# Patient Record
Sex: MALE | Race: WHITE | NOT HISPANIC OR LATINO | Employment: OTHER | URBAN - METROPOLITAN AREA
[De-identification: names, ages, dates, MRNs, and addresses within clinical notes are randomized per-mention and may not be internally consistent; named-entity substitution may affect disease eponyms.]

---

## 2023-07-18 ENCOUNTER — HOSPITAL ENCOUNTER (OUTPATIENT)
Dept: ULTRASOUND IMAGING | Facility: HOSPITAL | Age: 78
Discharge: HOME | End: 2023-07-18
Attending: UROLOGY | Admitting: UROLOGY
Payer: MEDICARE

## 2023-07-18 DIAGNOSIS — R97.20 ELEVATED PSA: ICD-10-CM

## 2023-07-18 PROCEDURE — 76942 ECHO GUIDE FOR BIOPSY: CPT

## 2023-07-18 PROCEDURE — G0416 PROSTATE BIOPSY, ANY MTHD: HCPCS | Performed by: PATHOLOGY

## 2023-07-18 PROCEDURE — 55700 HB BIOPSY OF PROSTATE: CPT

## 2023-07-18 RX ORDER — LIDOCAINE HYDROCHLORIDE 20 MG/ML
1 JELLY TOPICAL ONCE
Status: COMPLETED | OUTPATIENT
Start: 2023-07-18 | End: 2023-07-18

## 2023-07-18 RX ORDER — LIDOCAINE HYDROCHLORIDE 10 MG/ML
10 INJECTION, SOLUTION EPIDURAL; INFILTRATION; INTRACAUDAL; PERINEURAL ONCE
Status: COMPLETED | OUTPATIENT
Start: 2023-07-18 | End: 2023-07-18

## 2023-07-18 RX ADMIN — LIDOCAINE HYDROCHLORIDE 1 APPLICATION: 20 JELLY TOPICAL at 09:28

## 2023-07-18 RX ADMIN — LIDOCAINE HYDROCHLORIDE 10 ML: 10 INJECTION, SOLUTION EPIDURAL; INFILTRATION; INTRACAUDAL; PERINEURAL at 09:30

## 2023-07-18 NOTE — OP NOTE
Preoperative diagnosis  Elevated PSA  Postoperative diagnosis  Same, pathology pending  Procedure  Ultrasound-guided transrectal needle biopsy of the prostate  Surgeon  Harriet Gordillo DO  Anesthesia  Local  Brief history  This is a 71-year-old male who recently underwent PSA level determination on June 26, 2023  PSA came back elevated at 24.24. There was no underlying cause for the elevation such as urinary infection  On digital rectal examination there was loss of the median sulcus with prominence noted in that area on either side of the midline and in the midline  Biopsy is being pursued to rule out prostate malignancy.   Procedure  Patient was identified  Timeout was taken  Local anesthetic was instilled per rectum  Ultrasound probe was placed  Imaging the prostate gland was done  Under ultrasound guidance local anesthetic was instilled at the base of the prostate bilaterally  Under ultrasound guidance tissue cores were obtained 7 from the left, 6 from the right  Tissue cores were submitted in separate containers marked left and right for pathologic review  Ultrasound probe was withdrawn  Patient was discharged with written postoperative instructions  He is to follow-up with the office to review the biopsy results  Radiology will be dictating a separate report which reflects today's prostate ultrasound findings  Procedure was well tolerated

## 2023-07-19 PROCEDURE — G0416 PROSTATE BIOPSY, ANY MTHD: HCPCS | Performed by: PATHOLOGY

## 2023-08-09 ENCOUNTER — HOSPITAL ENCOUNTER (OUTPATIENT)
Dept: NUCLEAR MEDICINE | Facility: HOSPITAL | Age: 78
Discharge: HOME | End: 2023-08-09
Attending: UROLOGY
Payer: MEDICARE

## 2023-08-09 ENCOUNTER — HOSPITAL ENCOUNTER (OUTPATIENT)
Dept: CT IMAGING | Facility: HOSPITAL | Age: 78
Discharge: HOME | End: 2023-08-09
Attending: UROLOGY
Payer: MEDICARE

## 2023-08-09 DIAGNOSIS — C61 MALIGNANT NEOPLASM OF PROSTATE (HCC): ICD-10-CM

## 2023-08-09 PROCEDURE — A9503 TC99M MEDRONATE: HCPCS

## 2023-08-09 PROCEDURE — 78306 BONE IMAGING WHOLE BODY: CPT

## 2023-08-09 PROCEDURE — 74177 CT ABD & PELVIS W/CONTRAST: CPT

## 2023-08-09 PROCEDURE — G1004 CDSM NDSC: HCPCS

## 2023-08-09 RX ADMIN — IOHEXOL 100 ML: 350 INJECTION, SOLUTION INTRAVENOUS at 10:39

## 2023-08-23 ENCOUNTER — TRANSCRIBE ORDERS (OUTPATIENT)
Dept: RADIATION ONCOLOGY | Facility: HOSPITAL | Age: 78
End: 2023-08-23

## 2023-08-23 DIAGNOSIS — C61 MALIGNANT NEOPLASM OF PROSTATE (HCC): Primary | ICD-10-CM

## 2023-08-28 ENCOUNTER — RADIATION ONCOLOGY CONSULT (OUTPATIENT)
Dept: RADIATION ONCOLOGY | Facility: CLINIC | Age: 78
End: 2023-08-28
Payer: MEDICARE

## 2023-08-28 ENCOUNTER — CLINICAL SUPPORT (OUTPATIENT)
Dept: RADIATION ONCOLOGY | Facility: CLINIC | Age: 78
End: 2023-08-28
Attending: RADIOLOGY
Payer: MEDICARE

## 2023-08-28 ENCOUNTER — TELEPHONE (OUTPATIENT)
Dept: RADIATION ONCOLOGY | Facility: CLINIC | Age: 78
End: 2023-08-28

## 2023-08-28 VITALS
TEMPERATURE: 97 F | WEIGHT: 202.5 LBS | RESPIRATION RATE: 16 BRPM | DIASTOLIC BLOOD PRESSURE: 84 MMHG | BODY MASS INDEX: 30.79 KG/M2 | OXYGEN SATURATION: 96 % | HEART RATE: 56 BPM | SYSTOLIC BLOOD PRESSURE: 156 MMHG

## 2023-08-28 DIAGNOSIS — C61 PROSTATE CANCER (HCC): Primary | ICD-10-CM

## 2023-08-28 DIAGNOSIS — C61 MALIGNANT NEOPLASM OF PROSTATE (HCC): ICD-10-CM

## 2023-08-28 PROCEDURE — 99211 OFF/OP EST MAY X REQ PHY/QHP: CPT | Performed by: RADIOLOGY

## 2023-08-28 PROCEDURE — 99205 OFFICE O/P NEW HI 60 MIN: CPT | Performed by: RADIOLOGY

## 2023-08-28 RX ORDER — ROSUVASTATIN CALCIUM 5 MG/1
5 TABLET, COATED ORAL DAILY
COMMUNITY

## 2023-08-28 NOTE — PROGRESS NOTES
Consultation - Radiation Oncology      K:[id number] : 1945  Encounter: [id number]  Patient Information: Russel Haji      CHIEF COMPLAINT  Chief Complaint   Patient presents with   • Prostate Cancer   • Consult     Cancer Staging   Prostate cancer [facility])  Staging form: Prostate, AJCC 8th Edition  - Clinical stage from 2023: Stage IIIA (cT2a, cN0, cM0, PSA: 24.2, Grade Group: 1) - Signed by Frolyan Black MD on 2023  Stage prefix: Initial diagnosis  Prostate specific antigen (PSA) range: 20 or greater  [place] primary pattern: 3  [place] secondary pattern: 3  Histologic grading system: 5 grade system           History of Present Illness   Russel Haji is a 69 y.o. man with minimal comorbidities and excellent KPS who was recently diagnosed with T2aN0 prostate cancer, Adan 3+3=6 with pre-treatment PSA is 24.24 ng/ml. He is being referred by Dr. Иван Pena and presents today for consult.     23 PSA 24.24 ([facility])     23 Dr. Pacheco Quinones  Elevated PSA  Plan for biopsy     23 TRUS prostate biopsy  Prostate size is 4.9 x 5.8 x 4.0 cm = 60 gm. Pathology demonstrated 8/12 cores positive demonstrated Adan score 6 (3+3) involving 5-80% of the submitted tissue. Both lobes involved. PNI was identified both sides.     23 Dr. Pacheco Quinones  Review biopsy results  Order CT amd bone scan     23 CT A/P w contrast  No evidence of abdominopelvic metastatic disease.     23 bone scan  No scintigraphic evidence of osseous metastasis.     The patient generally feels well. He denies significant urinary symptoms including hematuria, dysuria, urinary incontinence. IPSS score is excellent at 0. He denies diarrhea, hemorrhoids, or rectal bleeding. He has no history of colonoscopy. He denies bone pain. He is not sexually active. [facility] ([facility]) erectile function score 0-1/3. Historical Information   Oncology History   Prostate cancer ([address])   2023 Biopsy    A.  Prostate, Left, needle Biopsy: - Prostatic adenocarcinoma,   -Goldonna grade 3 +3 = score  6  -Tumor involves five of total seven submitted core  -Tumor involves approximately 15%, 25%, 40%, 55%, 80% of each core biopsy  - Perineural invasion: Present. B. Prostate, Right, needle Biopsy:   - Prostatic adenocarcinoma,   -Goldonna grade 3 +3 = score  6  -Tumor involves three of total six submitted core  -Tumor involves approximately 5%,15%, 25% of each core biopsy  - Perineural invasion: Present.       2023 -  Cancer Staged    Staging form: Prostate, AJCC 8th Edition  - Clinical stage from 2023: Stage IIIA (cT2a, cN0, cM0, PSA: 24.2, Grade Group: 1) - Signed by Colt Lomeli MD on 2023  Stage prefix: Initial diagnosis  Prostate specific antigen (PSA) range: 20 or greater  [place] primary pattern: 3  [place] secondary pattern: 3  Histologic grading system: 5 grade system       2023 Initial Diagnosis    Prostate cancer [facility])           Past Medical History:   Diagnosis Date   • Asthma    • Hyperlipidemia    • Prostate cancer [facility])      Past Surgical History:   Procedure Laterality Date   • CATARACT EXTRACTION Bilateral    • TOOTH EXTRACTION     • US GUIDED PROSTATE BIOPSY  2023       Family History   Problem Relation Age of Onset   • Heart disease Mother        Social History   Social History     Substance and Sexual Activity   Alcohol Use Yes   • Alcohol/week: 4.0 standard drinks of alcohol   • Types: 4 Cans of beer per week     Social History     Substance and Sexual Activity   Drug Use Not on file     Social History     Tobacco Use   Smoking Status Former   • Packs/day: 1.50   • Types: Cigarettes   • Start date:    • Quit date:    • Years since quittin.6   Smokeless Tobacco Former   • Types: Chew   • Quit date:          Meds/Allergies     Current Outpatient Medications:   •  rosuvastatin (Crestor) 5 mg tablet, Take 5 mg by mouth daily, Disp: , Rfl:   No Known Allergies      Review of Systems Constitutional: Positive for fatigue. HENT: Positive for hearing loss. Eyes: Negative. Respiratory: Negative. Cardiovascular: Negative. Gastrointestinal: Negative. Genitourinary: Negative. Musculoskeletal: Negative. Skin: Negative. Allergic/Immunologic: Negative. Neurological: Negative. Hematological: Negative. Psychiatric/Behavioral: Positive for sleep disturbance. IPSS Questionnaire (AUA-7): Over the past month…     1)  How often have you had a sensation of not emptying your bladder completely after you finish urinating? 0 - Not at all   2)  How often have you had to urinate again less than two hours after you finished urinating? 0 - Not at all   3)  How often have you found you stopped and started again several times when you urinated? 0 - Not at all   4) How difficult have you found it to postpone urination? 0 - Not at all   5) How often have you had a weak urinary stream?  0 - Not at all   6) How often have you had to push or strain to begin urination? 0 - Not at all   7) How many times did you most typically get up to urinate from the time you went to bed until the time you got up in the morning?  0 - None   Total Score:  0         OBJECTIVE:   /84   Pulse 56   Temp (!) 97 °F (36.1 °C)   Resp 16   Wt 91.9 kg (202 lb 8 oz)   SpO2 96%   BMI 30.79 kg/m²   Performance Status: Karnofsky: 100 - Fully active, able to carry on all pre-disease performed without restriction    Physical Exam  Vitals and nursing note reviewed. Constitutional:       General: He is not in acute distress. Appearance: He is well-developed. Cardiovascular:      Rate and Rhythm: Normal rate and regular rhythm. Pulmonary:      Breath sounds: No wheezing, rhonchi or rales. Abdominal:      Palpations: Abdomen is soft. Tenderness: There is no abdominal tenderness. There is no right CVA tenderness or left CVA tenderness.    Genitourinary:     Comments: RONNI demonstrates enlarged prostate. There was small area firmness central base region, but no definite nodules. No blood on examiner's finger. Musculoskeletal:      Right lower leg: No edema. Left lower leg: No edema. Comments: No spinal tenderness to percussion   Lymphadenopathy:      Cervical: No cervical adenopathy. Upper Body:      Right upper body: No supraclavicular adenopathy. Left upper body: No supraclavicular adenopathy. Lower Body: No right inguinal adenopathy. No left inguinal adenopathy. Neurological:      Mental Status: He is alert and oriented to person, place, and time. Gait: Gait normal.          RESULTS  Imaging Studies  NM bone scan whole body    Result Date: 8/9/2023  Narrative: BONE SCAN  WHOLE BODY INDICATION: C61: Malignant neoplasm of prostate PREVIOUS FILM CORRELATION:    CT abdomen pelvis from same day. TECHNIQUE:   This study was performed following the intravenous administration of 27.3 mCi Tc-99m labeled MDP. Delayed, anterior and posterior whole body images were acquired, 2-3 hours after radiopharmaceutical administration. Additional delayed oblique static images acquired of the bilateral ribs and pelvis. FINDINGS: There is no scintigraphic evidence of osseous metastasis. Increased radiotracer activity in the bilateral manubrioclavicular joints, hands, wrists, thoracolumbar spine, medial left knee and right foot likely corresponding to degenerative changes. Only the right kidney demonstrates physiologic radiotracer activity, likely corresponding to significantly atrophied left kidney seen on same-day CT. Impression: 1. No scintigraphic evidence of osseous metastasis. Resident: Jennifer Cutler, the attending radiologist, have reviewed the images and agree with the final report above.  Workstation performed: [id number]     CT abdomen pelvis w contrast    Result Date: 8/9/2023  Narrative: CT ABDOMEN AND PELVIS WITH IV CONTRAST INDICATION:   C61: Malignant neoplasm of prostate. COMPARISON:  None. TECHNIQUE:  CT examination of the abdomen and pelvis was performed. Multiplanar 2D reformatted images were created from the source data. This examination, like all CT scans performed in the [facility], was performed utilizing techniques to minimize radiation dose exposure, including the use of iterative reconstruction and automated exposure control. Radiation dose length product (DLP) for this visit:  826.33 mGy-cm IV Contrast:  100 mL of iohexol (OMNIPAQUE) 350 Enteric Contrast: Enteric contrast was administered. FINDINGS: ABDOMEN LOWER CHEST: COPD. Minimal bibasilar reticular scarring. LIVER/BILIARY TREE:  Unremarkable. GALLBLADDER:  No calcified gallstones. No pericholecystic inflammatory change. SPLEEN:  Unremarkable. PANCREAS:  Unremarkable. ADRENAL GLANDS:  Unremarkable. KIDNEYS/URETERS: Markedly atretic left kidney likely on a developmental or congenital basis. One or more sharply circumscribed subcentimeter renal hypodensities are noted. These lesions are too small to accurately characterize, but are statistically most  likely to represent benign cortical renal cyst(s). According to the guidelines published in the [facility] Paper of the ACR Incidental Findings Committee (Radiology 2010), no further workup of these lesions is recommended. 1 cm right renal simple cyst. No perinephric collection. STOMACH AND BOWEL: Scattered colonic diverticula without diverticulitis. No bowel obstruction. APPENDIX: A normal appendix was visualized. ABDOMINOPELVIC CAVITY:  No ascites. No pneumoperitoneum. No lymphadenopathy. VESSELS: Aortoiliac calcification. No aneurysm. PELVIS REPRODUCTIVE ORGANS: Large prostate gland protruding into the bladder base. URINARY BLADDER: Unremarkable allowing for degree of distention. ABDOMINAL WALL/INGUINAL REGIONS: Small fat-containing bilateral inguinal hernias.  OSSEOUS STRUCTURES: No acute fracture or osseous destructive lesion identified. Degenerative changes of the spine and bilateral hip and sacroiliac joints. Impression: No evidence of abdominopelvic metastatic disease. Workstation performed: [id number]     Pathology:  • Collected 7/18/2023 10:00     • Status: Final result    • Visible to patient: Yes (seen)     • 0 Result Notes      Component    Case Report   Surgical Pathology Report                         Case: [id number]                                    Authorizing Provider:  Nghia Ramirez DO         Collected:           07/18/2023 1000               Ordering Location:     [facility]        Received:            07/18/2023 [facility] Ultrasound                                                          Pathologist:           Ignacio Hansen MD                                                        Specimens:   A) - Prostate, Left                                                                                 B) - Prostate, Right                                                                       Final Diagnosis   A. Prostate, Left, needle Biopsy:   - Prostatic adenocarcinoma,   -Adan grade 3 +3 = score  6  -Tumor involves five of total seven submitted core  -Tumor involves approximately 15%, 25%, 40%, 55%, 80% of each core biopsy  - Perineural invasion: Present. B. Prostate, Right, needle Biopsy:   - Prostatic adenocarcinoma,   -[place]el[place] grade 3 +3 = score  6  -Tumor involves three of total six submitted core  -Tumor involves approximately 5%,15%, 25% of each core biopsy  - Perineural invasion: Present. Electronically signed by Ignacio Hansen MD on 7/19/2023 at 1540   Note    Block A1 contain many tumor cells could be used for future molecular testing if clinically requested.    Intradepartmental consultation is in agreement   [facility], [address] [place] [address]       Clinical data  Op notes   This is a 50-year-old male who recently underwent PSA level determination on June 26, 2023  PSA came back elevated at 24.24              ASSESSMENT  1. Malignant neoplasm of prostate [facility])  Ambulatory Referral to Radiation Oncology        Cancer Staging   Prostate cancer [facility])  Staging form: Prostate, AJCC 8th Edition  - Clinical stage from 7/18/2023: Stage IIIA (cT2a, cN0, cM0, PSA: 24.2, Grade Group: 1) - Signed by Ricky Cage MD on 8/28/2023  Stage prefix: Initial diagnosis  Prostate specific antigen (PSA) range: 20 or greater  [place] primary pattern: 3  [place] secondary pattern: 3  Histologic grading system: 5 grade system      PLAN/DISCUSSION  Bob Faustin is a 69 y.o. man with excellent performance status and minimal comorbidities who was recently diagnosed clinical T2aN0 high-volume Adan score 6 (3+3) prostate cancer with a pretreatment PSA of 24.24 ng/mL. Staging work-up showed no metastatic disease. AUA and NCCN risk categorization is consistent with high risk prostate cancer. Due to the patient's age she is not considered a good surgical candidate. Given his excellent performance status and minimal comorbidities as well as high risk category, I did not feel he was an good candidate for active surveillance. I recommended concurrent long-term ADT for 18 months with definitive radiation to the prostate using moderately hypofractionated or conventional hypofractionation to 70-79.2 Gy in 28-44 fractions depending on normal tissue tolerance. I feel this would offer the patient benefit in terms of local control and possible cure. This is in accordance with NCCN guidelines. The rationale and potential benefits, as well as the risks and acute and late side effects and potential toxicities of radiation were discussed with the patient at length. Side effects discussed included, but were not limited to:  Fatigue, irritative urinary side effects, diarrhea, rectal urgency, radiation proctitis, erectile dysfunction, and radiation cystitis. He is 69years old and we discussed that any ADT is beneficial over none for high risk prostate cancer in terms of disease control. We discussed that for high risk prostate cancer patients additional ADT up to 18 to 24 months has demonstrated an additional improvement in survival.  However, if the patient cannot tolerate a full 18 to 24 months then shortening course would still provide benefit albeit less. They expressed understanding. We discussed alternative treatment options including definitive radiation alone, brachytherapy with ADT or as boost, SBRT with ADT or alone. I am concerned whether patient would be a candidate for SBRT or brachytherapy given his prostate size, but his IPSS score is excellent at 0 and he could be considered. I explained prostate brachytherapy is not available at [facility], but we would be happy to refer to tertiary center for consultation if he desires. The pros and cons of brachytherapy alone or as boost versus external beam radiation were discussed with the patient and his son. They did not wish to travel to another facility nor pursue this treatment. We discussed SBRT consideration. I explained that he will need fiducial markers and SpaceOAR for this procedure. We discussed the rationale for SpaceOAR for external beam radiation and SBRT in reducing risk of radiation proctitis. The patient did not wish to undergo further procedures if not necessary. The patient and his son were given the opportunity to ask questions and all questions were answered to their satisfaction. They wished to proceed with definitive ADT and radiation. Dr. Shahida Diggs no longer administers ADT, therefore we will refer patient to Urology at [facility] for treatment. Total Time Spent  65 minutes spent reviewing EMR in preparation for visit, with the patient, coordination with other providers, and documentation. Greater than 50% of total time was spent with the patient and/or family for counseling and/or coordination of care. Cyril Holder MD  8/28/2023,2:29 PM      Portions of the record may have been created with voice recognition software. Occasional wrong word or "sound a like" substitutions may have occurred due to the inherent limitations of voice recognition software. Read the chart carefully and recognize, using context, where substitutions have occurred.

## 2023-08-28 NOTE — TELEPHONE ENCOUNTER
Grazyna Gonzales (son) called back because patient "cannot hear on the phone." He is aware that Amaury Solano will be referred to [facility]) Urology because Dr. Raul Garcia does not give ADT and he is agreeable to transfer Urologist. We discussed that Amaury Solano will be scheduled for [facility] approximately 6 weeks after ADT is administered.

## 2023-08-28 NOTE — LETTER
2023     Christina Wheatley, DO  [facility]   [address]    Patient: Susan Travis   YOB: 1945   Date of Visit: 2023       Dear Dr. Julio Enamorado:    Thank you for referring Susan Travis to me for evaluation. Below are my notes for this consultation. If you have questions, please do not hesitate to call me. I look forward to following your patient along with you. Sincerely,        Hilliard Apgar, MD        CC: No Recipients    Hilliard Apgar, MD  2023  6:28 PM  Sign when Signing Visit  Consultation - Radiation Oncology      LPI:[id number] : 1945  Encounter: [id number]  Patient Information: [address]  Chief Complaint   Patient presents with   • Prostate Cancer   • Consult     Cancer Staging   Prostate cancer [facility])  Staging form: Prostate, AJCC 8th Edition  - Clinical stage from 2023: Stage IIIA (cT2a, cN0, cM0, PSA: 24.2, Grade Group: 1) - Signed by Hilliard Apgar, MD on 2023  Stage prefix: Initial diagnosis  Prostate specific antigen (PSA) range: 20 or greater  Adan primary pattern: 3  [place] secondary pattern: 3  Histologic grading system: 5 grade system           History of Present Illness   Susan Travis is a 69 y.o. man with minimal comorbidities and excellent KPS who was recently diagnosed with T2aN0 prostate cancer, [place] 3+3=6 with pre-treatment PSA is 24.24 ng/ml. He is being referred by Dr. Christina Wheatley and presents today for consult. 23 PSA 24.24 (HNL)     23 Dr. Aleta Pike  Elevated PSA  Plan for biopsy     23 TRUS prostate biopsy  Prostate size is 4.9 x 5.8 x 4.0 cm = 60 gm. Pathology demonstrated 8/12 cores positive demonstrated [place] score 6 (3+3) involving 5-80% of the submitted tissue. Both lobes involved. PNI was identified both sides.      23 Dr. Aleta Pike  Review biopsy results  Order CT amd bone scan     23 CT A/P w contrast  No evidence of abdominopelvic metastatic disease. 8/9/23 bone scan  No scintigraphic evidence of osseous metastasis. The patient generally feels well. He denies significant urinary symptoms including hematuria, dysuria, urinary incontinence. IPSS score is excellent at 0. He denies diarrhea, hemorrhoids, or rectal bleeding. He has no history of colonoscopy. He denies bone pain. He is not sexually active. [facility] ([facility]) erectile function score 0-1/3. Historical Information   Oncology History   Prostate cancer ([address])   7/18/2023 Biopsy    A. Prostate, Left, needle Biopsy:   - Prostatic adenocarcinoma,   -[place] grade 3 +3 = score  6  -Tumor involves five of total seven submitted core  -Tumor involves approximately 15%, 25%, 40%, 55%, 80% of each core biopsy  - Perineural invasion: Present. B. Prostate, Right, needle Biopsy:   - Prostatic adenocarcinoma,   -[place] grade 3 +3 = score  6  -Tumor involves three of total six submitted core  -Tumor involves approximately 5%,15%, 25% of each core biopsy  - Perineural invasion: Present.       7/18/2023 -  Cancer Staged    Staging form: Prostate, AJCC 8th Edition  - Clinical stage from 7/18/2023: Stage IIIA (cT2a, cN0, cM0, PSA: 24.2, Grade Group: 1) - Signed by Avila Calderon MD on 8/28/2023  Stage prefix: Initial diagnosis  Prostate specific antigen (PSA) range: 20 or greater  [place] primary pattern: 3  [place] secondary pattern: 3  Histologic grading system: 5 grade system       8/23/2023 Initial Diagnosis    Prostate cancer [facility])           Past Medical History:   Diagnosis Date   • Asthma    • Hyperlipidemia    • Prostate cancer [facility])      Past Surgical History:   Procedure Laterality Date   • CATARACT EXTRACTION Bilateral    • TOOTH EXTRACTION     • US GUIDED PROSTATE BIOPSY  07/18/2023       Family History   Problem Relation Age of Onset   • Heart disease Mother        Social History   Social History     Substance and Sexual Activity   Alcohol Use Yes   • Alcohol/week: 4.0 standard drinks of alcohol   • Types: 4 Cans of beer per week     Social History     Substance and Sexual Activity   Drug Use Not on file     Social History     Tobacco Use   Smoking Status Former   • Packs/day: 1.50   • Types: Cigarettes   • Start date: 5   • Quit date:    • Years since quittin.6   Smokeless Tobacco Former   • Types: Chew   • Quit date:          Meds/Allergies     Current Outpatient Medications:   •  rosuvastatin (Crestor) 5 mg tablet, Take 5 mg by mouth daily, Disp: , Rfl:   No Known Allergies      Review of Systems   Constitutional: Positive for fatigue. HENT: Positive for hearing loss. Eyes: Negative. Respiratory: Negative. Cardiovascular: Negative. Gastrointestinal: Negative. Genitourinary: Negative. Musculoskeletal: Negative. Skin: Negative. Allergic/Immunologic: Negative. Neurological: Negative. Hematological: Negative. Psychiatric/Behavioral: Positive for sleep disturbance. IPSS Questionnaire (AUA-7): Over the past month…     1)  How often have you had a sensation of not emptying your bladder completely after you finish urinating? 0 - Not at all   2)  How often have you had to urinate again less than two hours after you finished urinating? 0 - Not at all   3)  How often have you found you stopped and started again several times when you urinated? 0 - Not at all   4) How difficult have you found it to postpone urination? 0 - Not at all   5) How often have you had a weak urinary stream?  0 - Not at all   6) How often have you had to push or strain to begin urination?   0 - Not at all   7) How many times did you most typically get up to urinate from the time you went to bed until the time you got up in the morning?  0 - None   Total Score:  0         OBJECTIVE:   /84   Pulse 56   Temp (!) 97 °F (36.1 °C)   Resp 16   Wt 91.9 kg (202 lb 8 oz)   SpO2 96%   BMI 30.79 kg/m²   Performance Status: Karnofsky: 100 - Fully active, able to carry on all pre-disease performed without restriction    Physical Exam  Vitals and nursing note reviewed. Constitutional:       General: He is not in acute distress. Appearance: He is well-developed. Cardiovascular:      Rate and Rhythm: Normal rate and regular rhythm. Pulmonary:      Breath sounds: No wheezing, rhonchi or rales. Abdominal:      Palpations: Abdomen is soft. Tenderness: There is no abdominal tenderness. There is no right CVA tenderness or left CVA tenderness. Genitourinary:     Comments: RONNI demonstrates enlarged prostate. There was small area firmness central base region, but no definite nodules. No blood on examiner's finger. Musculoskeletal:      Right lower leg: No edema. Left lower leg: No edema. Comments: No spinal tenderness to percussion   Lymphadenopathy:      Cervical: No cervical adenopathy. Upper Body:      Right upper body: No supraclavicular adenopathy. Left upper body: No supraclavicular adenopathy. Lower Body: No right inguinal adenopathy. No left inguinal adenopathy. Neurological:      Mental Status: He is alert and oriented to person, place, and time. Gait: Gait normal.          RESULTS  Imaging Studies  NM bone scan whole body    Result Date: 8/9/2023  Narrative: BONE SCAN  WHOLE BODY INDICATION: C61: Malignant neoplasm of prostate PREVIOUS FILM CORRELATION:    CT abdomen pelvis from same day. TECHNIQUE:   This study was performed following the intravenous administration of 27.3 mCi Tc-99m labeled MDP. Delayed, anterior and posterior whole body images were acquired, 2-3 hours after radiopharmaceutical administration. Additional delayed oblique static images acquired of the bilateral ribs and pelvis. FINDINGS: There is no scintigraphic evidence of osseous metastasis.  Increased radiotracer activity in the bilateral manubrioclavicular joints, hands, wrists, thoracolumbar spine, medial left knee and right foot likely corresponding to degenerative changes. Only the right kidney demonstrates physiologic radiotracer activity, likely corresponding to significantly atrophied left kidney seen on same-day CT. Impression: 1. No scintigraphic evidence of osseous metastasis. Resident: Rigoberto Hollis, the attending radiologist, have reviewed the images and agree with the final report above. Workstation performed: [id number]     CT abdomen pelvis w contrast    Result Date: 8/9/2023  Narrative: CT ABDOMEN AND PELVIS WITH IV CONTRAST INDICATION:   C61: Malignant neoplasm of prostate. COMPARISON:  None. TECHNIQUE:  CT examination of the abdomen and pelvis was performed. Multiplanar 2D reformatted images were created from the source data. This examination, like all CT scans performed in the [facility], was performed utilizing techniques to minimize radiation dose exposure, including the use of iterative reconstruction and automated exposure control. Radiation dose length product (DLP) for this visit:  826.33 mGy-cm IV Contrast:  100 mL of iohexol (OMNIPAQUE) 350 Enteric Contrast: Enteric contrast was administered. FINDINGS: ABDOMEN LOWER CHEST: COPD. Minimal bibasilar reticular scarring. LIVER/BILIARY TREE:  Unremarkable. GALLBLADDER:  No calcified gallstones. No pericholecystic inflammatory change. SPLEEN:  Unremarkable. PANCREAS:  Unremarkable. ADRENAL GLANDS:  Unremarkable. KIDNEYS/URETERS: Markedly atretic left kidney likely on a developmental or congenital basis. One or more sharply circumscribed subcentimeter renal hypodensities are noted. These lesions are too small to accurately characterize, but are statistically most  likely to represent benign cortical renal cyst(s). According to the guidelines published in the [place] [place] Paper of the ACR Incidental Findings Committee (Radiology 2010), no further workup of these lesions is recommended. 1 cm right renal simple cyst. No perinephric collection.  STOMACH AND BOWEL: Scattered colonic diverticula without diverticulitis. No bowel obstruction. APPENDIX: A normal appendix was visualized. ABDOMINOPELVIC CAVITY:  No ascites. No pneumoperitoneum. No lymphadenopathy. VESSELS: Aortoiliac calcification. No aneurysm. PELVIS REPRODUCTIVE ORGANS: Large prostate gland protruding into the bladder base. URINARY BLADDER: Unremarkable allowing for degree of distention. ABDOMINAL WALL/INGUINAL REGIONS: Small fat-containing bilateral inguinal hernias. OSSEOUS STRUCTURES: No acute fracture or osseous destructive lesion identified. Degenerative changes of the spine and bilateral hip and sacroiliac joints. Impression: No evidence of abdominopelvic metastatic disease. Workstation performed: [id number]     Pathology:  • Collected 7/18/2023 10:00     • Status: Final result    • Visible to patient: Yes (seen)     • 0 Result Notes      Component    Case Report   Surgical Pathology Report                         Case: [id number]                                    Authorizing Provider:  Tracey Grimm DO         Collected:           07/18/2023 1000               Ordering Location:     [address] [place] [address]        Received:            07/18/2023 [facility] Ultrasound                                                          Pathologist:           Jeffry Lr MD                                                        Specimens:   A) - Prostate, Left                                                                                 B) - Prostate, Right                                                                       Final Diagnosis   A. Prostate, Left, needle Biopsy:   - Prostatic adenocarcinoma,   -[place] grade 3 +3 = score  6  -Tumor involves five of total seven submitted core  -Tumor involves approximately 15%, 25%, 40%, 55%, 80% of each core biopsy  - Perineural invasion: Present.          B. Prostate, Right, needle Biopsy:   - Prostatic adenocarcinoma,   -[place] grade 3 +3 = score  6  -Tumor involves three of total six submitted core  -Tumor involves approximately 5%,15%, 25% of each core biopsy  - Perineural invasion: Present. Electronically signed by Mahad Noel MD on 7/19/2023 at 1540   Note    Block A1 contain many tumor cells could be used for future molecular testing if clinically requested. Intradepartmental consultation is in agreement   Interpretation performed at [facility], [address] [facility], [address] [place] [address] notes   This is a 50-year-old male who recently underwent PSA level determination on June 26, 2023  PSA came back elevated at 24.24              ASSESSMENT  1. Malignant neoplasm of prostate [facility])  Ambulatory Referral to Radiation Oncology        Cancer Staging   Prostate cancer [facility])  Staging form: Prostate, AJCC 8th Edition  - Clinical stage from 7/18/2023: Stage IIIA (cT2a, cN0, cM0, PSA: 24.2, Grade Group: 1) - Signed by Germania Crespo MD on 8/28/2023  Stage prefix: Initial diagnosis  Prostate specific antigen (PSA) range: 20 or greater  [place]an primary pattern: 3  [place] secondary pattern: 3  Histologic grading system: 5 grade system      PLAN/DISCUSSION  Shaista Russell is a 69 y.o. man with excellent performance status and minimal comorbidities who was recently diagnosed clinical T2aN0 high-volume [place] score 6 (3+3) prostate cancer with a pretreatment PSA of 24.24 ng/mL. Staging work-up showed no metastatic disease. AUA and NCCN risk categorization is consistent with high risk prostate cancer. Due to the patient's age she is not considered a good surgical candidate. Given his excellent performance status and minimal comorbidities as well as high risk category, I did not feel he was an good candidate for active surveillance.     I recommended concurrent long-term ADT for 18 months with definitive radiation to the prostate using moderately hypofractionated or conventional hypofractionation to 70-79.2 Gy in 28-44 fractions depending on normal tissue tolerance. I feel this would offer the patient benefit in terms of local control and possible cure. This is in accordance with NCCN guidelines. The rationale and potential benefits, as well as the risks and acute and late side effects and potential toxicities of radiation were discussed with the patient at length. Side effects discussed included, but were not limited to: Fatigue, irritative urinary side effects, diarrhea, rectal urgency, radiation proctitis, erectile dysfunction, and radiation cystitis. He is 69years old and we discussed that any ADT is beneficial over none for high risk prostate cancer in terms of disease control. We discussed that for high risk prostate cancer patients additional ADT up to 18 to 24 months has demonstrated an additional improvement in survival.  However, if the patient cannot tolerate a full 18 to 24 months then shortening course would still provide benefit albeit less. They expressed understanding. We discussed alternative treatment options including definitive radiation alone, brachytherapy with ADT or as boost, SBRT with ADT or alone. I am concerned whether patient would be a candidate for SBRT or brachytherapy given his prostate size, but his IPSS score is excellent at 0 and he could be considered. I explained prostate brachytherapy is not available at [facility], but we would be happy to refer to tertiary center for consultation if he desires. The pros and cons of brachytherapy alone or as boost versus external beam radiation were discussed with the patient and his son. They did not wish to travel to another facility nor pursue this treatment. We discussed SBRT consideration. I explained that he will need fiducial markers and SpaceOAR for this procedure. We discussed the rationale for SpaceOAR for external beam radiation and SBRT in reducing risk of radiation proctitis. The patient did not wish to undergo further procedures if not necessary. The patient and his son were given the opportunity to ask questions and all questions were answered to their satisfaction. They wished to proceed with definitive ADT and radiation. Dr. Frank Navarrete no longer administers ADT, therefore we will refer patient to Urology at [facility] for treatment. Total Time Spent  65 minutes spent reviewing EMR in preparation for visit, with the patient, coordination with other providers, and documentation. Greater than 50% of total time was spent with the patient and/or family for counseling and/or coordination of care. Olivia Joe MD  8/28/2023,2:29 PM      Portions of the record may have been created with voice recognition software. Occasional wrong word or "sound a like" substitutions may have occurred due to the inherent limitations of voice recognition software. Read the chart carefully and recognize, using context, where substitutions have occurred.

## 2023-08-28 NOTE — TELEPHONE ENCOUNTER
Hayden Zaragoza was seen in consult today by Dr. Kylie Lamar for prostate cancer. He was referred by Dr. Kristina Jj. Patient declined SpaceOAR and fiducial markers. Dr. Kylie Lamar is recommending 18 months of ADT. Called Dr. Carrera Mins office. Spoke to [employer]. She stated Dr. Cj Pratt does not give ADT. Dr. Kylie Lamar aware and will need to refer to [facility]) Urology. Siomara Reyes. [place] at [phone]. Identified myself, left office number and requested a call back.

## 2023-08-28 NOTE — PROGRESS NOTES
Martín Ours 1945 is a 69 y.o. male who was diagnosed with prostate cancer, [place] 3+3=6. His pre-treatment PSA is 24.24 ng/ml. He is being referred by Dr. Tapan Guerrero and presents today for consult. 6/26/23 PSA 24.24 (HNL)    7/11/23 Dr. Tamra Alfaro  Elevated PSA  Plan for biopsy    7/18/23 TRUS prostate biopsy  Prostate size is 4.9 x 5.8 x 4.0 cm = 60 gm.    7/24/23 Dr. Tamra Alfaro  Review biopsy results  Order CT amd bone scan    8/9/23 CT A/P w contrast  No evidence of abdominopelvic metastatic disease. 8/9/23 bone scan  No scintigraphic evidence of osseous metastasis. 8/24/23 Dr. Tamra Alfaro  Refer to rad onc      Oncology History   Prostate cancer [facility])   7/18/2023 Biopsy    A. Prostate, Left, needle Biopsy:   - Prostatic adenocarcinoma,   -[place] grade 3 +3 = score  6  -Tumor involves five of total seven submitted core  -Tumor involves approximately 15%, 25%, 40%, 55%, 80% of each core biopsy  - Perineural invasion: Present. B. Prostate, Right, needle Biopsy:   - Prostatic adenocarcinoma,   -[place] grade 3 +3 = score  6  -Tumor involves three of total six submitted core  -Tumor involves approximately 5%,15%, 25% of each core biopsy  - Perineural invasion: Present. 8/23/2023 Initial Diagnosis    Prostate cancer ([address])         Review of Systems:  Review of Systems   Constitutional: Positive for fatigue. HENT: Positive for hearing loss. Eyes: Negative. Respiratory: Negative. Cardiovascular: Negative. Gastrointestinal: Negative. Genitourinary: Negative. Musculoskeletal: Negative. Skin: Negative. Allergic/Immunologic: Negative. Neurological: Negative. Hematological: Negative. Psychiatric/Behavioral: Positive for sleep disturbance. Clinical Trial: no    IPSS Questionnaire (AUA-7): Over the past month…    1)  How often have you had a sensation of not emptying your bladder completely after you finish urinating?   0 - Not at all   2)  How often have you had to urinate again less than two hours after you finished urinating? 0 - Not at all   3)  How often have you found you stopped and started again several times when you urinated? 0 - Not at all   4) How difficult have you found it to postpone urination? 0 - Not at all   5) How often have you had a weak urinary stream?  0 - Not at all   6) How often have you had to push or strain to begin urination? 0 - Not at all   7) How many times did you most typically get up to urinate from the time you went to bed until the time you got up in the morning?  0 - None   Total Score:  0       Pain assessment: 0    PFT: N/A    Prior Radiation: no    Teaching: NIH book, side effects, SIM    MST: completed    Implantable Devices (Port, pacemaker, pain stimulator): no    Hip Replacement: no    Health Maintenance   Topic Date Due   • Hepatitis C Screening  Never done   • Medicare Annual Wellness Visit (AWV)  Never done   • COVID-19 Vaccine (1) Never done   • Pneumococcal Vaccine: 65+ Years (1 - PCV) Never done   • Depression Screening  Never done   • BMI: Adult  Never done   • Fall Risk  Never done   • Influenza Vaccine (1) 09/01/2023   • HIB Vaccine  Aged Out   • IPV Vaccine  Aged Out   • Hepatitis A Vaccine  Aged Out   • Meningococcal ACWY Vaccine  Aged Out   • HPV Vaccine  Aged Out       No past medical history on file. Past Surgical History:   Procedure Laterality Date   • US GUIDED PROSTATE BIOPSY  7/18/2023       No family history on file. No current outpatient medications on file. Not on File     There were no vitals filed for this visit.

## 2023-08-28 NOTE — TELEPHONE ENCOUNTER
Please schedule urology consult and ADT for patient ASAP. Please contact son for appt. Appt notes are in Media tab.  Thank you

## 2023-08-29 DIAGNOSIS — C61 PROSTATE CANCER (HCC): Primary | ICD-10-CM

## 2023-08-30 ENCOUNTER — DOCUMENTATION (OUTPATIENT)
Dept: HEMATOLOGY ONCOLOGY | Facility: CLINIC | Age: 78
End: 2023-08-30

## 2023-08-30 NOTE — PROGRESS NOTES
Chart reviewed. Patient is scheduled for ADT on 9/7/23. I will make sure he receives ADT and make sure SIM is scheduled.

## 2023-08-30 NOTE — TELEPHONE ENCOUNTER
appt double booked - will call pts son to confirm and then [facility]

## 2023-08-31 ENCOUNTER — PATIENT OUTREACH (OUTPATIENT)
Dept: CASE MANAGEMENT | Facility: HOSPITAL | Age: 78
End: 2023-08-31

## 2023-08-31 NOTE — PROGRESS NOTES
OncSW referral received, chart review completed. Pt has consented to radiation tx and will start in October. MSW will outreach closer to the start of tx to assess for needs. No immediate needs noted on chart review.

## 2023-09-06 PROBLEM — Z79.818 ANDROGEN DEPRIVATION THERAPY: Status: ACTIVE | Noted: 2023-09-06

## 2023-09-06 PROBLEM — IMO0001 ANDROGEN DEPRIVATION THERAPY: Status: ACTIVE | Noted: 2023-09-06

## 2023-09-06 PROBLEM — Z76.89 ENCOUNTER FOR SUPPORT AND COORDINATION OF TRANSITION OF CARE: Status: ACTIVE | Noted: 2023-09-06

## 2023-09-06 NOTE — PROGRESS NOTES
Problem List Items Addressed This Visit        Genitourinary    Prostate cancer ([address]) - Primary    Relevant Medications    degarelix acetate (FIRMAGON) injection 240 mg       Other    Encounter for support and coordination of transition of care    Androgen deprivation therapy    Relevant Medications    Calcium Carb-Cholecalciferol (calcium carbonate-vitamin D) 500 mg-5 mcg tablet           Discussion:      Kristie Arce and I had a long discussion today regarding androgen deprivation therapy and the mechanism of action. We detailed the hypothalamic testicular axis and how Vu Rodriguez and Lupron work. We talked about the timing of adjuvant deprivation therapy administration versus initiation of radiation therapy as well. 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 He will continue his level of physical activity throughout his course of action deprivation therapy. We talked about the importance of this for maintenance of bone health and bone density. I have prescribed him calcium and vitamin D supplementation to maximize bone health as well. We did talk about potential side effects of androgen ablation therapy including changes in sexual function and libido as well as potential fatigue and changes in body fat and muscular distribution. All questions and concerns answered and addressed with regard to his cancer diagnosis and administration of anticancer/hormonal therapy here in the office. He will return in 4 weeks for placement of a 6-month Lupron Depo injection      He will then see our office every 6 months with a PSA prior and for Lupron. His visits can be staggered with those follow-up visits with radiation oncology to decrease his clinical visit burden. Portions of the above record have been created with voice recognition software. Occasional wrong word or "sound alike" substitution may have occurred due to the inherent limitations of voice recognition software.   Read the chart carefully and recognize, using context, where substitution may have occurred. Assessment and plan:       Please see problem oriented charting for the assessment plan of today's urological complaints      Osiris Espinosa MD      Chief Complaint     As listed above      History of Present Illness     William Garg is a 69 y.o. man with high risk prostate cancer. Adan 6 on biopsy but PSA of 24    ECOG is 0    Quite active and physically productive in his work as a lal    The following portions of the patient's history were reviewed and updated as appropriate: allergies, current medications, past family history, past medical history, past social history, past surgical history and problem list.    Detailed Urologic History     - please refer to HPI    Review of Systems     Review of Systems   Constitutional: Negative. HENT: Positive for hearing loss. Eyes: Negative. Respiratory: Negative. Cardiovascular: Negative. Gastrointestinal: Negative. Endocrine: Negative. Genitourinary: Negative. Musculoskeletal: Negative. Skin: Negative. Allergic/Immunologic: Negative. Neurological: Negative. Hematological: Negative. Psychiatric/Behavioral: Negative. Allergies     No Known Allergies    Physical Exam     Physical Exam  Vitals reviewed. Constitutional:       General: He is not in acute distress. Appearance: Normal appearance. He is not ill-appearing, toxic-appearing or diaphoretic. HENT:      Head: Normocephalic and atraumatic. Comments: Hard of hearing  Pulmonary:      Effort: No respiratory distress. Abdominal:      General: There is no distension. Musculoskeletal:         General: No deformity. Skin:     Coloration: Skin is not jaundiced. Neurological:      General: No focal deficit present. Mental Status: He is alert. Cranial Nerves: No cranial nerve deficit.    Psychiatric:         Mood and Affect: Mood normal.         Behavior: Behavior normal.         Thought Content: Thought content normal.         Judgment: Judgment normal.             Vital Signs  Vitals:    23 1430   BP: 126/84   Pulse: 71   SpO2: 98%   Weight: 89.8 kg (198 lb)   Height: 5' 8" (1.727 m)         Current Medications       Current Outpatient Medications:   •  albuterol (PROVENTIL HFA,VENTOLIN HFA) 90 mcg/act inhaler, as needed, Disp: , Rfl:   •  Calcium Carb-Cholecalciferol (calcium carbonate-vitamin D) 500 mg-5 mcg tablet, Take 1 tablet by mouth 2 (two) times a day with meals, Disp: 180 tablet, Rfl: 3  •  fluticasone-umeclidinium-vilanterol 200-62.5-25 mcg/actuation AEPB inhaler, Inhale 1 puff daily Rinse mouth after use., Disp: , Rfl:   •  fluticasone-vilanterol 200-25 mcg/actuation inhaler, INHALE 1 PUFF DAILY.  RINSE, GARGLE AND SPIT POST USE., Disp: , Rfl:   •  rosuvastatin (Crestor) 5 mg tablet, Take 5 mg by mouth daily, Disp: , Rfl:     Current Facility-Administered Medications:   •  degarelix acetate (FIRMAGON) injection 240 mg, 240 mg, Subcutaneous, Once, Susan Nash MD      Active Problems     Patient Active Problem List   Diagnosis   • Prostate cancer [facility])   • Encounter for support and coordination of transition of care   • Androgen deprivation therapy         Past Medical History     Past Medical History:   Diagnosis Date   • Asthma    • Hyperlipidemia    • Prostate cancer [facility])          Surgical History     Past Surgical History:   Procedure Laterality Date   • CATARACT EXTRACTION Bilateral    • TOOTH EXTRACTION     • US GUIDED PROSTATE BIOPSY  2023         Family History     Family History   Problem Relation Age of Onset   • Heart disease Mother          Social History     Social History     Social History     Tobacco Use   Smoking Status Former   • Packs/day: 1.50   • Types: Cigarettes   • Start date:    • Quit date:    • Years since quittin.7   • Passive exposure: Past   Smokeless Tobacco Former   • Types: Chew   • Quit date: 2016         Pertinent Lab Values     No results found for: "CREATININE"    No results found for: "PSA"

## 2023-09-07 ENCOUNTER — CLINICAL SUPPORT (OUTPATIENT)
Dept: UROLOGY | Facility: CLINIC | Age: 78
End: 2023-09-07
Payer: MEDICARE

## 2023-09-07 VITALS
SYSTOLIC BLOOD PRESSURE: 126 MMHG | OXYGEN SATURATION: 98 % | HEIGHT: 68 IN | DIASTOLIC BLOOD PRESSURE: 84 MMHG | BODY MASS INDEX: 30.01 KG/M2 | WEIGHT: 198 LBS | HEART RATE: 71 BPM

## 2023-09-07 DIAGNOSIS — Z76.89 ENCOUNTER FOR SUPPORT AND COORDINATION OF TRANSITION OF CARE: ICD-10-CM

## 2023-09-07 DIAGNOSIS — Z79.818 ANDROGEN DEPRIVATION THERAPY: ICD-10-CM

## 2023-09-07 DIAGNOSIS — C61 PROSTATE CANCER (HCC): Primary | ICD-10-CM

## 2023-09-07 PROCEDURE — 96402 CHEMO HORMON ANTINEOPL SQ/IM: CPT

## 2023-09-07 PROCEDURE — 99204 OFFICE O/P NEW MOD 45 MIN: CPT | Performed by: UROLOGY

## 2023-09-07 RX ORDER — ALBUTEROL SULFATE 90 UG/1
AEROSOL, METERED RESPIRATORY (INHALATION) AS NEEDED
COMMUNITY
Start: 2023-08-07

## 2023-09-07 RX ORDER — FLUTICASONE FUROATE AND VILANTEROL 200; 25 UG/1; UG/1
POWDER RESPIRATORY (INHALATION)
COMMUNITY
Start: 2023-08-07

## 2023-09-07 RX ORDER — LANOLIN ALCOHOL/MO/W.PET/CERES
1 CREAM (GRAM) TOPICAL 2 TIMES DAILY WITH MEALS
Qty: 180 TABLET | Refills: 3 | Status: SHIPPED | OUTPATIENT
Start: 2023-09-07 | End: 2024-09-01

## 2023-09-08 ENCOUNTER — DOCUMENTATION (OUTPATIENT)
Dept: HEMATOLOGY ONCOLOGY | Facility: CLINIC | Age: 78
End: 2023-09-08

## 2023-09-08 DIAGNOSIS — C61 PROSTATE CANCER (HCC): Primary | ICD-10-CM

## 2023-09-08 NOTE — PROGRESS NOTES
Chart reviewed. Patient received his ADT on 9/7/23. Message sent to RAD ONC to schedule SIM.  I will follow up on date in the near future

## 2023-09-14 ENCOUNTER — PATIENT OUTREACH (OUTPATIENT)
Dept: HEMATOLOGY ONCOLOGY | Facility: CLINIC | Age: 78
End: 2023-09-14

## 2023-09-14 NOTE — PROGRESS NOTES
Outreach made to patient. I LM introducing myself ad asked patient to return my call to discuss his care further. I left my direct line.

## 2023-09-15 ENCOUNTER — PATIENT OUTREACH (OUTPATIENT)
Dept: HEMATOLOGY ONCOLOGY | Facility: CLINIC | Age: 78
End: 2023-09-15

## 2023-09-15 DIAGNOSIS — C61 PROSTATE CANCER (HCC): Primary | ICD-10-CM

## 2023-09-15 NOTE — PROGRESS NOTES
Outreach made to patient. LM for patient introducing myself. I [facility] I will attempt to contact patient once more in the near future.

## 2023-09-15 NOTE — PROGRESS NOTES
Patients son Daniel Session returned my call. Daniel Session is not on communication consent but I did speak with patient and confirmed I can speak to him. Patient is hard of hearing so son called to answer general assessment questions. We went over questions together. They do not have any further questions or concerns at this time but knows I remain available if any arise.

## 2023-09-19 ENCOUNTER — TELEPHONE (OUTPATIENT)
Dept: GENETICS | Facility: CLINIC | Age: 78
End: 2023-09-19

## 2023-09-19 NOTE — TELEPHONE ENCOUNTER
I called Melisa Urban to schedule a new patient appointment with the Cancer Risk and Genetics Program.  Spoke with his son Esther Davis. Outcome:  Genetics appointment scheduled for 10/26 at 9 am via televisit. Personal/Family History Related to Appointment:  Prostate Ca. Fhx of breast ca (daughter). Non-Orthodox.      History of Genetic Testing:  Patient reports no personal or family history of genetic testing    Genetics Family History Questionnaire:  I confirmed the patient's e-mail on file as the best e-mail to send an invite link for our genetics family history intake

## 2023-10-05 ENCOUNTER — OFFICE VISIT (OUTPATIENT)
Dept: UROLOGY | Facility: CLINIC | Age: 78
End: 2023-10-05
Payer: MEDICARE

## 2023-10-05 VITALS
BODY MASS INDEX: 29.55 KG/M2 | WEIGHT: 195 LBS | HEIGHT: 68 IN | HEART RATE: 89 BPM | OXYGEN SATURATION: 98 % | DIASTOLIC BLOOD PRESSURE: 84 MMHG | SYSTOLIC BLOOD PRESSURE: 126 MMHG

## 2023-10-05 DIAGNOSIS — C61 PROSTATE CANCER (HCC): Primary | ICD-10-CM

## 2023-10-05 PROCEDURE — 96402 CHEMO HORMON ANTINEOPL SQ/IM: CPT

## 2023-10-05 PROCEDURE — 99213 OFFICE O/P EST LOW 20 MIN: CPT | Performed by: PHYSICIAN ASSISTANT

## 2023-10-05 NOTE — PROGRESS NOTES
10/5/2023      Chief Complaint   Patient presents with   • Follow-up         Assessment and Plan    69 y.o. male     1. Prostate cancer with PSA of 24  - Saw rad onc (8/28/23) with recommendations of ADT for 18-24 months and radiation   - Firmagon given (9/7/23)  - Lupron given today (10/5/23)  - Discussed weight bearing exercises and vitamin D and calcium  - Follow up in 6 months with additional PSA prior to visit  - Call with any questions or concerns in the meantime  - All questions answered; patient understands and agrees with plan       History of Present Illness  Ara Chaney is a 69 y.o. male patient with history of prostate cancer here for follow up. Patient with PSA of 24. Recommendations from rad onc and urology MD to have ADT for 18-24 months with radiation. Patient had firmagon and presents today for first Lupron. Denies new or worsening symptoms. Review of Systems   Constitutional: Negative for activity change, appetite change, chills and fever. HENT: Negative for congestion and trouble swallowing. Respiratory: Negative for cough and shortness of breath. Cardiovascular: Negative for chest pain, palpitations and leg swelling. Gastrointestinal: Negative for abdominal pain, constipation, diarrhea, nausea and vomiting. Genitourinary: Negative for difficulty urinating, dysuria, flank pain, frequency, hematuria and urgency. Musculoskeletal: Negative for back pain and gait problem. Skin: Negative for wound. Allergic/Immunologic: Negative for immunocompromised state. Neurological: Negative for dizziness and syncope. Hematological: Does not bruise/bleed easily. Psychiatric/Behavioral: Negative for confusion. All other systems reviewed and are negative. Vitals  Vitals:    10/05/23 1256   BP: 126/84   Pulse: 89   SpO2: 98%   Weight: 88.5 kg (195 lb)   Height: 5' 8" (1.727 m)       Physical Exam  Constitutional:       General: He is not in acute distress.      Appearance: Normal appearance. He is not ill-appearing, toxic-appearing or diaphoretic. HENT:      Head: Normocephalic. Nose: No congestion. Eyes:      General: No scleral icterus. Right eye: No discharge. Left eye: No discharge. Conjunctiva/sclera: Conjunctivae normal.      Pupils: Pupils are equal, round, and reactive to light. Pulmonary:      Effort: Pulmonary effort is normal.   Musculoskeletal:      Cervical back: Normal range of motion. Skin:     General: Skin is warm and dry. Coloration: Skin is not jaundiced or pale. Findings: No bruising, erythema, lesion or rash. Neurological:      General: No focal deficit present. Mental Status: He is alert and oriented to person, place, and time. Mental status is at baseline. Gait: Gait normal.   Psychiatric:         Mood and Affect: Mood normal.         Behavior: Behavior normal.         Thought Content: Thought content normal.         Judgment: Judgment normal.           Past History  Past Medical History:   Diagnosis Date   • Asthma    • Hyperlipidemia    • Prostate cancer ([address] [facility])      Social History     Socioeconomic History   • Marital status: /Civil Union     Spouse name: None   • Number of children: None   • Years of education: None   • Highest education level: None   Occupational History   • None   Tobacco Use   • Smoking status: Former     Packs/day: 1.50     Types: Cigarettes     Start date: 5     Quit date:      Years since quittin.7     Passive exposure: Past   • Smokeless tobacco: Former     Types: Chew     Quit date:    Vaping Use   • Vaping Use: Never used   Substance and Sexual Activity   • Alcohol use:  Yes     Alcohol/week: 4.0 standard drinks of alcohol     Types: 4 Cans of beer per week   • Drug use: Never   • Sexual activity: Not Currently   Other Topics Concern   • None   Social History Narrative   • None     Social Determinants of Health     Financial Resource Strain: Not on file Food Insecurity: Not on file   Transportation Needs: Not on file   Physical Activity: Not on file   Stress: Not on file   Social Connections: Not on file   Intimate Partner Violence: Not on file   Housing Stability: Not on file     Social History     Tobacco Use   Smoking Status Former   • Packs/day: 1.50   • Types: Cigarettes   • Start date: 5   • Quit date: 12   • Years since quittin.7   • Passive exposure: Past   Smokeless Tobacco Former   • Types: Chew   • Quit date:      Family History   Problem Relation Age of Onset   • Heart disease Mother        The following portions of the patient's history were reviewed and updated as appropriate: allergies, current medications, past medical history, past social history, past surgical history and problem list.    Results  No results found for this or any previous visit (from the past 1 hour(s)). ]  No results found for: "PSA"  No results found for: "GLUCOSE", "CALCIUM", "NA", "K", "CO2", "CL", "BUN", "CREATININE"  No results found for: "WBC", "HGB", "HCT", "MCV", "PLT"    Keven Crabtree PA-C

## 2023-10-10 ENCOUNTER — PATIENT OUTREACH (OUTPATIENT)
Dept: CASE MANAGEMENT | Facility: HOSPITAL | Age: 78
End: 2023-10-10

## 2023-10-13 PROCEDURE — 77263 THER RADIOLOGY TX PLNG CPLX: CPT | Performed by: RADIOLOGY

## 2023-10-18 ENCOUNTER — APPOINTMENT (OUTPATIENT)
Dept: RADIATION ONCOLOGY | Facility: CLINIC | Age: 78
End: 2023-10-18
Attending: RADIOLOGY
Payer: MEDICARE

## 2023-10-18 PROCEDURE — 77334 RADIATION TREATMENT AID(S): CPT | Performed by: RADIOLOGY

## 2023-10-20 ENCOUNTER — PATIENT OUTREACH (OUTPATIENT)
Dept: CASE MANAGEMENT | Facility: HOSPITAL | Age: 78
End: 2023-10-20

## 2023-10-20 NOTE — PROGRESS NOTES
Biopsychosocial and Barriers Assessment    Cancer Diagnosis: prostate  Home/Cell Phone: [phone]  Emergency Contact: yasmine Lara  Marital Status:   Interpretation concerns, speaks another language, preferred language: speaks [place]  Cultural concerns: none noted  Ability to read or write: independent    Caregiver/Support: yasmine Lara, 3 other adult children  Children: 4 adult children, all live locally  Child/Elder care: NA    Housing: lives local to [facility] [address]:   Lives With: lives alone  Daily Living Activities: independent  Durable Medical Equipment: none  Ambulation: independent    Preferred Pharmacy:   High co-pays with insurance: no concerns  High co-pays with medication coverage: no concerns  No medication coverage: NA    Primary Care Provider: Dr. Sary Serna  Hx of [address]: none  Hx of Short term rehab: none  Mental Health Hx: none  Substance Abuse Hx: none  Employment: retired   Status/Location:  Ability to pay bills: not a concern  POA/LW/AD:  Transportation Plan/Concerns: pt's children will drive him to tx appts      What do you know about your Cancer Diagnosis    What has your doctor told you about your cancer diagnosis:    What has your doctor told you about your cancer treatment:    What specific concerns do you have about your diagnosis and treatment:    Have you been made aware of any hair loss associated with treatment:    Additional Comments:      MSW s/w pt's son Grazyna Gonzales this afternoon to introduce myself as a resource to them and assess for any needs. Pt's listed phone number is his sons and he explained that he manages all of pt's appts and needs. He says that pt is ready to get started with tx and is overall doing well. He shared that pt does well at home and has 4 adult children who live locally and will be assisting as needed, including with transportation.   Pt is retired and Grazyna Gonzales says there are no concerns about his health insurance coverage, [facility] medical bills, or finances. He continues to deny any needs at this time, I let him know he is welcome to reach out as needed in the future either directly or through the office. He thanked me for the call, I will remain available to them as needed moving forward.

## 2023-10-26 ENCOUNTER — DOCUMENTATION (OUTPATIENT)
Dept: GENETICS | Facility: CLINIC | Age: 78
End: 2023-10-26

## 2023-10-26 ENCOUNTER — CLINICAL SUPPORT (OUTPATIENT)
Dept: GENETICS | Facility: CLINIC | Age: 78
End: 2023-10-26
Payer: MEDICARE

## 2023-10-26 DIAGNOSIS — Z80.3 FAMILY HISTORY OF BREAST CANCER: ICD-10-CM

## 2023-10-26 DIAGNOSIS — C61 PROSTATE CANCER (HCC): Primary | ICD-10-CM

## 2023-10-26 PROCEDURE — 96040 PR MEDICAL GENETICS COUNSELING EACH 30 MINUTES: CPT | Performed by: GENETIC COUNSELOR, MS

## 2023-10-26 NOTE — LETTER
2023     Александр Morgan MD  [place] [address]    Patient: Natalie Amaya  YOB: 1945  Date of Visit: 10/26/2023      Dear Dr. Fredy Stern: Thank you for referring Natalie Amaya to me for evaluation. Below are my notes for this consultation. If you have questions, please do not hesitate to call me. I look forward to following your patient along with you. Sincerely,        Zaina Miller GC        CC: No Recipients        Pre-Test Genetic Counseling Consult Note    Patient Name: Natalie Amaya   /Age: 1945/78 y.o. Referring Provider: Александр Morgan MD     Date of Service: 10/26/2023  Genetic Counselor: Verner Colon, MS, [address] [place] [address]  Interpretation Services: None  Location: Telephone consult   Length of Visit: 61 Minutes    Lisa Staples was referred to the [address] [place], [place] [id number] Floor and Genetic Assessment Program due to his personal history of prostate cancer and family history of breast cancer . He presents today to discuss the possibility of a hereditary cancer syndrome, options for genetic testing, and implications for him and his family. His son Aleta Gerardo accompanied him to the appointment and was the primary correspondence. Cancer History and Treatment:     Personal History: Personal history of prostate cancer at age 69    23 Final Diagnosis   A. Prostate, Left, needle Biopsy:   - Prostatic adenocarcinoma,   -Adan grade 3 +3 = score  6  -Tumor involves five of total seven submitted core  -Tumor involves approximately 15%, 25%, 40%, 55%, 80% of each core biopsy  - Perineural invasion: Present. B. Prostate, Right, needle Biopsy:   - Prostatic adenocarcinoma,   -Adan grade 3 +3 = score  6  -Tumor involves three of total six submitted core  -Tumor involves approximately 5%,15%, 25% of each core biopsy  - Perineural invasion: Present.       Screening Hx:     Colon:  Colonoscopy: No current screening     Skin:  No current screening    Medical and Surgical History  Pertinent surgical history:   Past Surgical History:   Procedure Laterality Date   • CATARACT EXTRACTION Bilateral    • TOOTH EXTRACTION     • US GUIDED PROSTATE BIOPSY  07/18/2023      Pertinent medical history:  Past Medical History:   Diagnosis Date   • Asthma    • Hyperlipidemia    • Prostate cancer ([address])        Other History:  Height:   Ht Readings from Last 1 Encounters:   10/05/23 5' 8" (1.727 m)     Weight:   Wt Readings from Last 1 Encounters:   10/05/23 88.5 kg (195 lb)     Relevant Family History   Patient reports no Ashkenazi Amish ancestry. Please refer to the scanned pedigree in the Media Tab for a complete family history     *All history is reported as provided by the patient; records are not available for review, except where indicated. Assessment:  We discussed sporadic, familial and hereditary cancer. We also discussed the many factors that influence our risk for cancer such as age, environmental exposures, lifestyle choices and family history. We reviewed the indications suggestive of a hereditary predisposition to cancer. Genetic testing is indicated for Lisa Staples based on the following criteria: Meets NCCN V2.2024 Testing Criteria for Prostate Cancer Susceptibility Genes: Personal history of prostate cancer (any age) with a close blood relative (daughter) diagnosed with breast cancer at age 32 (under the age of 48). The risks, benefits, and limitations of genetic testing were reviewed with the patient, as well as genetic discrimination laws, and possible test results (positive, negative, variants of uncertain significance) and their clinical implications. If positive for a mutation, options for managing cancer risk including increased surveillance, chemoprevention, and in some cases prophylactic surgery were discussed.  Lisa Staples was informed that if a hereditary cancer syndrome was identified in him, first degree relatives (parents, siblings, and children) have a chance of also inheriting the condition. Genetic testing would allow for predictive genetic testing in other relatives, who may also be at risk depending on their degree of relation. Billing:  Based on Peter's primary insurance being Medicare and [employer], he should expect an out of pocket cost of $0. Justyna Zayas verbalized understanding. Plan: Patient decided to proceed with testing and provided consent. Summary:     Sample Collection:  A blood kit was mailed home to the patient    Genetic Testing Preformed: [employer]: Cancer® +[employer]ight® (61 genes): APC, DAREN, AXIN2, BAP1, BARD1, BMPR1A, BRCA1, BRCA2, BRIP1, CDH1, CDK4, CDKN1B, CDKN2A, CHEK2, CTNNA1, DICER1, EGLN1, EPCAM, FH, FLCN, GREM1, HOXB13, KIF1B, KIT, MAX, MEN1, MET, MITF, MLH1, MSH2, MSH3, MSH6, MUTYH, NBN, NF1, NTHL1, PALB2, PMS2, POLD1, POLE, POT1, PTEN, RAD50, RAD51C, RAD51D, RB1, RECQL, RET, SDHA, SDHAF2, SDHB, SDHC, SDHD, SMAD4, SMARCA4, STK11, LBDS031, TP53, TSC1, TSC2, VHL      Result Call Information:  In the event that we need to reach Justyna Zayas via telephone:  I confirmed the patient's son's Harvinder's phone number [phone] as the best number to call with results  I confirmed with the patient that we can leave a voicemail on the provided numbers    Results take approximately 2-3 weeks to complete once test is started. Justyna Zayas will be notified via [facility] once results are available. Additional recommendations for surveillance/medical management will be made pending genetic test results.

## 2023-10-26 NOTE — PROGRESS NOTES
Pre-Test Genetic Counseling Consult Note    Patient Name: Ariadna PIÑA/Age: 1945/78 y.o. Referring Provider: Kevin Solano MD     Date of Service: 10/26/2023  Genetic Counselor: Deanna Fernández MS, [address] [facility]  Interpretation Services: None  Location: Telephone consult   Length of Visit: 61 Minutes    Gwen Hamilton was referred to the [address] [place] [address],[address] and Genetic Assessment Program due to his personal history of prostate cancer and family history of breast cancer . He presents today to discuss the possibility of a hereditary cancer syndrome, options for genetic testing, and implications for him and his family. His son Venita Ariza accompanied him to the appointment and was the primary correspondence. Cancer History and Treatment:     Personal History: Personal history of prostate cancer at age 69    23 Final Diagnosis   A. Prostate, Left, needle Biopsy:   - Prostatic adenocarcinoma,   -Adan grade 3 +3 = score  6  -Tumor involves five of total seven submitted core  -Tumor involves approximately 15%, 25%, 40%, 55%, 80% of each core biopsy  - Perineural invasion: Present. B. Prostate, Right, needle Biopsy:   - Prostatic adenocarcinoma,   -[place] grade 3 +3 = score  6  -Tumor involves three of total six submitted core  -Tumor involves approximately 5%,15%, 25% of each core biopsy  - Perineural invasion: Present.       Screening Hx:     Colon:  Colonoscopy: No current screening     Skin:  No current screening    Medical and Surgical History  Pertinent surgical history:   Past Surgical History:   Procedure Laterality Date    CATARACT EXTRACTION Bilateral     TOOTH EXTRACTION      US GUIDED PROSTATE BIOPSY  2023      Pertinent medical history:  Past Medical History:   Diagnosis Date    Asthma     Hyperlipidemia     Prostate cancer ([address] [facility])        Other History:  Height:   Ht Readings from Last 1 Encounters:   10/05/23 5' 8" (1.727 m)     Weight:   Wt Readings from Last 1 Encounters:   10/05/23 88.5 kg (195 lb)     Relevant Family History   Patient reports no Ashkenazi Gnosticist ancestry. Please refer to the scanned pedigree in the Media Tab for a complete family history     *All history is reported as provided by the patient; records are not available for review, except where indicated. Assessment:  We discussed sporadic, familial and hereditary cancer. We also discussed the many factors that influence our risk for cancer such as age, environmental exposures, lifestyle choices and family history. We reviewed the indications suggestive of a hereditary predisposition to cancer. Genetic testing is indicated for Theresa Cardozo based on the following criteria: Meets NCCN V2.2024 Testing Criteria for Prostate Cancer Susceptibility Genes: Personal history of prostate cancer (any age) with a close blood relative (daughter) diagnosed with breast cancer at age 32 (under the age of 48). The risks, benefits, and limitations of genetic testing were reviewed with the patient, as well as genetic discrimination laws, and possible test results (positive, negative, variants of uncertain significance) and their clinical implications. If positive for a mutation, options for managing cancer risk including increased surveillance, chemoprevention, and in some cases prophylactic surgery were discussed. Theresa Cardozo was informed that if a hereditary cancer syndrome was identified in him, first degree relatives (parents, siblings, and children) have a chance of also inheriting the condition. Genetic testing would allow for predictive genetic testing in other relatives, who may also be at risk depending on their degree of relation. Billing:  Based on Peter's primary insurance being Medicare and [employer], he should expect an out of pocket cost of $0. Theresa Cardozo verbalized understanding. Plan: Patient decided to proceed with testing and provided consent.     Summary:     Sample Collection:  A blood kit was mailed home to the patient    Genetic Testing Preformed: CustomNext: Cancer® +RNAinsight® (61 genes): APC, DAREN, AXIN2, BAP1, BARD1, BMPR1A, BRCA1, BRCA2, BRIP1, CDH1, CDK4, CDKN1B, CDKN2A, CHEK2, CTNNA1, DICER1, EGLN1, EPCAM, FH, FLCN, GREM1, HOXB13, KIF1B, KIT, MAX, MEN1, MET, MITF, MLH1, MSH2, MSH3, MSH6, MUTYH, NBN, NF1, NTHL1, PALB2, PMS2, POLD1, POLE, POT1, PTEN, RAD50, RAD51C, RAD51D, RB1, RECQL, RET, SDHA, SDHAF2, SDHB, SDHC, SDHD, SMAD4, SMARCA4, STK11, SUYE534, TP53, TSC1, TSC2, VHL      Result Call Information:  In the event that we need to reach Denny Rust via telephone:  I confirmed the patient's son's Harvinder's phone number [phone] as the best number to call with results  I confirmed with the patient that we can leave a voicemail on the provided numbers    Results take approximately 2-3 weeks to complete once test is started. Denny Rust will be notified via [employer] once results are available. Additional recommendations for surveillance/medical management will be made pending genetic test results.

## 2023-10-30 ENCOUNTER — APPOINTMENT (OUTPATIENT)
Dept: RADIATION ONCOLOGY | Facility: CLINIC | Age: 78
End: 2023-10-30
Attending: RADIOLOGY
Payer: MEDICARE

## 2023-10-30 PROCEDURE — 77301 RADIOTHERAPY DOSE PLAN IMRT: CPT | Performed by: RADIOLOGY

## 2023-10-30 PROCEDURE — 77300 RADIATION THERAPY DOSE PLAN: CPT | Performed by: RADIOLOGY

## 2023-10-30 PROCEDURE — 77338 DESIGN MLC DEVICE FOR IMRT: CPT | Performed by: RADIOLOGY

## 2023-11-01 ENCOUNTER — APPOINTMENT (OUTPATIENT)
Dept: RADIATION ONCOLOGY | Facility: CLINIC | Age: 78
End: 2023-11-01
Payer: MEDICARE

## 2023-11-01 ENCOUNTER — APPOINTMENT (OUTPATIENT)
Dept: RADIATION ONCOLOGY | Facility: CLINIC | Age: 78
End: 2023-11-01
Attending: RADIOLOGY
Payer: MEDICARE

## 2023-11-01 ENCOUNTER — APPOINTMENT (OUTPATIENT)
Dept: LAB | Facility: CLINIC | Age: 78
End: 2023-11-01
Payer: MEDICARE

## 2023-11-01 DIAGNOSIS — C61 PROSTATE CANCER (HCC): ICD-10-CM

## 2023-11-01 DIAGNOSIS — C61 PROSTATE CANCER (HCC): Primary | ICD-10-CM

## 2023-11-01 LAB
ANISOCYTOSIS BLD QL SMEAR: PRESENT
BASOPHILS # BLD MANUAL: 0.06 THOUSAND/UL (ref 0–0.1)
BASOPHILS NFR MAR MANUAL: 1 % (ref 0–1)
DACRYOCYTES BLD QL SMEAR: PRESENT
EOSINOPHIL # BLD MANUAL: 0.52 THOUSAND/UL (ref 0–0.4)
EOSINOPHIL NFR BLD MANUAL: 9 % (ref 0–6)
ERYTHROCYTE [DISTWIDTH] IN BLOOD BY AUTOMATED COUNT: 14.5 % (ref 11.6–15.1)
HCT VFR BLD AUTO: 37.5 % (ref 36.5–49.3)
HGB BLD-MCNC: 12.9 G/DL (ref 12–17)
LYMPHOCYTES # BLD AUTO: 1.22 THOUSAND/UL (ref 0.6–4.47)
LYMPHOCYTES # BLD AUTO: 16 % (ref 14–44)
MACROCYTES BLD QL AUTO: PRESENT
MCH RBC QN AUTO: 32.9 PG (ref 26.8–34.3)
MCHC RBC AUTO-ENTMCNC: 34.4 G/DL (ref 31.4–37.4)
MCV RBC AUTO: 96 FL (ref 82–98)
MONOCYTES # BLD AUTO: 0.76 THOUSAND/UL (ref 0–1.22)
MONOCYTES NFR BLD: 13 % (ref 4–12)
NEUTROPHILS # BLD MANUAL: 3.26 THOUSAND/UL (ref 1.85–7.62)
NEUTS BAND NFR BLD MANUAL: 8 % (ref 0–8)
NEUTS SEG NFR BLD AUTO: 48 % (ref 43–75)
OVALOCYTES BLD QL SMEAR: PRESENT
PLATELET # BLD AUTO: 193 THOUSANDS/UL (ref 149–390)
PLATELET BLD QL SMEAR: ADEQUATE
PMV BLD AUTO: 10.2 FL (ref 8.9–12.7)
POIKILOCYTOSIS BLD QL SMEAR: PRESENT
PSA SERPL-MCNC: 0.91 NG/ML (ref 0–4)
RBC # BLD AUTO: 3.92 MILLION/UL (ref 3.88–5.62)
RBC MORPH BLD: PRESENT
VARIANT LYMPHS # BLD AUTO: 5 %
WBC # BLD AUTO: 5.82 THOUSAND/UL (ref 4.31–10.16)

## 2023-11-01 PROCEDURE — 85007 BL SMEAR W/DIFF WBC COUNT: CPT

## 2023-11-01 PROCEDURE — 85027 COMPLETE CBC AUTOMATED: CPT

## 2023-11-01 PROCEDURE — 84153 ASSAY OF PSA TOTAL: CPT

## 2023-11-01 PROCEDURE — 77385 HB NTSTY MODUL RAD TX DLVR SMPL: CPT | Performed by: RADIOLOGY

## 2023-11-01 PROCEDURE — 77014 CHG CT GUIDANCE RADIATION THERAPY FLDS PLACEMENT: CPT | Performed by: RADIOLOGY

## 2023-11-01 PROCEDURE — 77427 RADIATION TX MANAGEMENT X5: CPT | Performed by: RADIOLOGY

## 2023-11-01 PROCEDURE — 36415 COLL VENOUS BLD VENIPUNCTURE: CPT

## 2023-11-02 ENCOUNTER — APPOINTMENT (OUTPATIENT)
Dept: RADIATION ONCOLOGY | Facility: CLINIC | Age: 78
End: 2023-11-02
Attending: RADIOLOGY
Payer: MEDICARE

## 2023-11-02 PROCEDURE — 77385 HB NTSTY MODUL RAD TX DLVR SMPL: CPT | Performed by: STUDENT IN AN ORGANIZED HEALTH CARE EDUCATION/TRAINING PROGRAM

## 2023-11-02 PROCEDURE — 77014 CHG CT GUIDANCE RADIATION THERAPY FLDS PLACEMENT: CPT | Performed by: STUDENT IN AN ORGANIZED HEALTH CARE EDUCATION/TRAINING PROGRAM

## 2023-11-03 ENCOUNTER — APPOINTMENT (OUTPATIENT)
Dept: RADIATION ONCOLOGY | Facility: CLINIC | Age: 78
End: 2023-11-03
Attending: RADIOLOGY
Payer: MEDICARE

## 2023-11-03 PROCEDURE — 77385 HB NTSTY MODUL RAD TX DLVR SMPL: CPT | Performed by: RADIOLOGY

## 2023-11-03 PROCEDURE — 77014 CHG CT GUIDANCE RADIATION THERAPY FLDS PLACEMENT: CPT | Performed by: RADIOLOGY

## 2023-11-06 ENCOUNTER — APPOINTMENT (OUTPATIENT)
Dept: RADIATION ONCOLOGY | Facility: CLINIC | Age: 78
End: 2023-11-06
Attending: RADIOLOGY
Payer: MEDICARE

## 2023-11-06 ENCOUNTER — TELEPHONE (OUTPATIENT)
Dept: UROLOGY | Facility: CLINIC | Age: 78
End: 2023-11-06

## 2023-11-06 PROCEDURE — 77014 CHG CT GUIDANCE RADIATION THERAPY FLDS PLACEMENT: CPT | Performed by: RADIOLOGY

## 2023-11-06 PROCEDURE — 77385 HB NTSTY MODUL RAD TX DLVR SMPL: CPT | Performed by: RADIOLOGY

## 2023-11-06 NOTE — TELEPHONE ENCOUNTER
Called and left VM for the PT with results and Melissa SHI recommendations. Office number left for the PT if any questions.    ----- Message from Rex Wade PA-C sent at 11/2/2023  8:17 AM EDT -----  Please let patient know PSA is now 0.91. Follow up as scheduled.  Thanks

## 2023-11-07 ENCOUNTER — APPOINTMENT (OUTPATIENT)
Dept: RADIATION ONCOLOGY | Facility: CLINIC | Age: 78
End: 2023-11-07
Attending: RADIOLOGY
Payer: MEDICARE

## 2023-11-07 PROCEDURE — 77014 CHG CT GUIDANCE RADIATION THERAPY FLDS PLACEMENT: CPT | Performed by: RADIOLOGY

## 2023-11-07 PROCEDURE — 77385 HB NTSTY MODUL RAD TX DLVR SMPL: CPT | Performed by: RADIOLOGY

## 2023-11-07 PROCEDURE — 77336 RADIATION PHYSICS CONSULT: CPT | Performed by: RADIOLOGY

## 2023-11-08 ENCOUNTER — APPOINTMENT (OUTPATIENT)
Dept: RADIATION ONCOLOGY | Facility: CLINIC | Age: 78
End: 2023-11-08
Attending: RADIOLOGY
Payer: MEDICARE

## 2023-11-08 ENCOUNTER — APPOINTMENT (OUTPATIENT)
Dept: RADIATION ONCOLOGY | Facility: CLINIC | Age: 78
End: 2023-11-08
Payer: MEDICARE

## 2023-11-08 PROCEDURE — 77427 RADIATION TX MANAGEMENT X5: CPT | Performed by: RADIOLOGY

## 2023-11-08 PROCEDURE — 77014 CHG CT GUIDANCE RADIATION THERAPY FLDS PLACEMENT: CPT | Performed by: RADIOLOGY

## 2023-11-08 PROCEDURE — 77385 HB NTSTY MODUL RAD TX DLVR SMPL: CPT | Performed by: RADIOLOGY

## 2023-11-09 ENCOUNTER — APPOINTMENT (OUTPATIENT)
Dept: RADIATION ONCOLOGY | Facility: CLINIC | Age: 78
End: 2023-11-09
Attending: RADIOLOGY
Payer: MEDICARE

## 2023-11-09 PROCEDURE — 77014 CHG CT GUIDANCE RADIATION THERAPY FLDS PLACEMENT: CPT | Performed by: STUDENT IN AN ORGANIZED HEALTH CARE EDUCATION/TRAINING PROGRAM

## 2023-11-09 PROCEDURE — 77385 HB NTSTY MODUL RAD TX DLVR SMPL: CPT | Performed by: STUDENT IN AN ORGANIZED HEALTH CARE EDUCATION/TRAINING PROGRAM

## 2023-11-10 ENCOUNTER — APPOINTMENT (OUTPATIENT)
Dept: RADIATION ONCOLOGY | Facility: CLINIC | Age: 78
End: 2023-11-10
Attending: RADIOLOGY
Payer: MEDICARE

## 2023-11-10 PROCEDURE — 77385 HB NTSTY MODUL RAD TX DLVR SMPL: CPT | Performed by: RADIOLOGY

## 2023-11-10 PROCEDURE — 77014 CHG CT GUIDANCE RADIATION THERAPY FLDS PLACEMENT: CPT | Performed by: RADIOLOGY

## 2023-11-13 ENCOUNTER — TELEPHONE (OUTPATIENT)
Dept: GENETICS | Facility: CLINIC | Age: 78
End: 2023-11-13

## 2023-11-13 ENCOUNTER — APPOINTMENT (OUTPATIENT)
Dept: RADIATION ONCOLOGY | Facility: CLINIC | Age: 78
End: 2023-11-13
Attending: RADIOLOGY
Payer: MEDICARE

## 2023-11-13 PROCEDURE — 77385 HB NTSTY MODUL RAD TX DLVR SMPL: CPT | Performed by: RADIOLOGY

## 2023-11-13 PROCEDURE — 77014 CHG CT GUIDANCE RADIATION THERAPY FLDS PLACEMENT: CPT | Performed by: RADIOLOGY

## 2023-11-13 NOTE — TELEPHONE ENCOUNTER
Informed Peter's son, Esther Davis, that his father's genetic test results returned negative. He is aware that the results and post-test consult note are available in his father's MyChart.

## 2023-11-14 ENCOUNTER — APPOINTMENT (OUTPATIENT)
Dept: RADIATION ONCOLOGY | Facility: CLINIC | Age: 78
End: 2023-11-14
Attending: RADIOLOGY
Payer: MEDICARE

## 2023-11-14 PROCEDURE — 77014 CHG CT GUIDANCE RADIATION THERAPY FLDS PLACEMENT: CPT | Performed by: STUDENT IN AN ORGANIZED HEALTH CARE EDUCATION/TRAINING PROGRAM

## 2023-11-14 PROCEDURE — 77385 HB NTSTY MODUL RAD TX DLVR SMPL: CPT | Performed by: STUDENT IN AN ORGANIZED HEALTH CARE EDUCATION/TRAINING PROGRAM

## 2023-11-14 PROCEDURE — 77336 RADIATION PHYSICS CONSULT: CPT | Performed by: RADIOLOGY

## 2023-11-15 ENCOUNTER — APPOINTMENT (OUTPATIENT)
Dept: RADIATION ONCOLOGY | Facility: CLINIC | Age: 78
End: 2023-11-15
Attending: RADIOLOGY
Payer: MEDICARE

## 2023-11-15 DIAGNOSIS — C61 PROSTATE CANCER (HCC): Primary | ICD-10-CM

## 2023-11-15 PROCEDURE — 77014 CHG CT GUIDANCE RADIATION THERAPY FLDS PLACEMENT: CPT | Performed by: RADIOLOGY

## 2023-11-15 PROCEDURE — 77427 RADIATION TX MANAGEMENT X5: CPT | Performed by: RADIOLOGY

## 2023-11-15 PROCEDURE — 77385 HB NTSTY MODUL RAD TX DLVR SMPL: CPT | Performed by: RADIOLOGY

## 2023-11-16 ENCOUNTER — APPOINTMENT (OUTPATIENT)
Dept: RADIATION ONCOLOGY | Facility: CLINIC | Age: 78
End: 2023-11-16
Attending: RADIOLOGY
Payer: MEDICARE

## 2023-11-16 PROCEDURE — 77385 HB NTSTY MODUL RAD TX DLVR SMPL: CPT | Performed by: RADIOLOGY

## 2023-11-16 PROCEDURE — 77014 CHG CT GUIDANCE RADIATION THERAPY FLDS PLACEMENT: CPT | Performed by: RADIOLOGY

## 2023-11-17 ENCOUNTER — APPOINTMENT (OUTPATIENT)
Dept: RADIATION ONCOLOGY | Facility: CLINIC | Age: 78
End: 2023-11-17
Attending: RADIOLOGY
Payer: MEDICARE

## 2023-11-17 PROCEDURE — 77385 HB NTSTY MODUL RAD TX DLVR SMPL: CPT | Performed by: RADIOLOGY

## 2023-11-17 PROCEDURE — 77014 CHG CT GUIDANCE RADIATION THERAPY FLDS PLACEMENT: CPT | Performed by: RADIOLOGY

## 2023-11-19 ENCOUNTER — APPOINTMENT (OUTPATIENT)
Dept: RADIATION ONCOLOGY | Facility: CLINIC | Age: 78
End: 2023-11-19
Payer: MEDICARE

## 2023-11-19 PROCEDURE — 77014 CHG CT GUIDANCE RADIATION THERAPY FLDS PLACEMENT: CPT | Performed by: RADIOLOGY

## 2023-11-19 PROCEDURE — 77385 HB NTSTY MODUL RAD TX DLVR SMPL: CPT | Performed by: RADIOLOGY

## 2023-11-20 ENCOUNTER — APPOINTMENT (OUTPATIENT)
Dept: LAB | Facility: HOSPITAL | Age: 78
End: 2023-11-20
Attending: RADIOLOGY
Payer: MEDICARE

## 2023-11-20 ENCOUNTER — APPOINTMENT (OUTPATIENT)
Dept: RADIATION ONCOLOGY | Facility: CLINIC | Age: 78
End: 2023-11-20
Attending: RADIOLOGY
Payer: MEDICARE

## 2023-11-20 DIAGNOSIS — C61 PROSTATE CANCER (HCC): ICD-10-CM

## 2023-11-20 LAB
ANION GAP SERPL CALCULATED.3IONS-SCNC: 6 MMOL/L
BASOPHILS # BLD AUTO: 0.04 THOUSANDS/ÂΜL (ref 0–0.1)
BASOPHILS NFR BLD AUTO: 1 % (ref 0–1)
BUN SERPL-MCNC: 33 MG/DL (ref 5–25)
CALCIUM SERPL-MCNC: 9.6 MG/DL (ref 8.4–10.2)
CHLORIDE SERPL-SCNC: 100 MMOL/L (ref 96–108)
CO2 SERPL-SCNC: 28 MMOL/L (ref 21–32)
CREAT SERPL-MCNC: 1.24 MG/DL (ref 0.6–1.3)
EOSINOPHIL # BLD AUTO: 0.5 THOUSAND/ÂΜL (ref 0–0.61)
EOSINOPHIL NFR BLD AUTO: 10 % (ref 0–6)
ERYTHROCYTE [DISTWIDTH] IN BLOOD BY AUTOMATED COUNT: 14.2 % (ref 11.6–15.1)
GFR SERPL CREATININE-BSD FRML MDRD: 55 ML/MIN/1.73SQ M
GLUCOSE SERPL-MCNC: 92 MG/DL (ref 65–140)
HCT VFR BLD AUTO: 38.9 % (ref 36.5–49.3)
HGB BLD-MCNC: 13.3 G/DL (ref 12–17)
IMM GRANULOCYTES # BLD AUTO: 0.03 THOUSAND/UL (ref 0–0.2)
IMM GRANULOCYTES NFR BLD AUTO: 1 % (ref 0–2)
LYMPHOCYTES # BLD AUTO: 1.04 THOUSANDS/ÂΜL (ref 0.6–4.47)
LYMPHOCYTES NFR BLD AUTO: 20 % (ref 14–44)
MCH RBC QN AUTO: 31.8 PG (ref 26.8–34.3)
MCHC RBC AUTO-ENTMCNC: 34.2 G/DL (ref 31.4–37.4)
MCV RBC AUTO: 93 FL (ref 82–98)
MONOCYTES # BLD AUTO: 0.78 THOUSAND/ÂΜL (ref 0.17–1.22)
MONOCYTES NFR BLD AUTO: 15 % (ref 4–12)
NEUTROPHILS # BLD AUTO: 2.88 THOUSANDS/ÂΜL (ref 1.85–7.62)
NEUTS SEG NFR BLD AUTO: 53 % (ref 43–75)
NRBC BLD AUTO-RTO: 0 /100 WBCS
PLATELET # BLD AUTO: 179 THOUSANDS/UL (ref 149–390)
PMV BLD AUTO: 9.8 FL (ref 8.9–12.7)
POTASSIUM SERPL-SCNC: 5.3 MMOL/L (ref 3.5–5.3)
RBC # BLD AUTO: 4.18 MILLION/UL (ref 3.88–5.62)
SODIUM SERPL-SCNC: 134 MMOL/L (ref 135–147)
WBC # BLD AUTO: 5.27 THOUSAND/UL (ref 4.31–10.16)

## 2023-11-20 PROCEDURE — 77336 RADIATION PHYSICS CONSULT: CPT | Performed by: RADIOLOGY

## 2023-11-20 PROCEDURE — 77014 CHG CT GUIDANCE RADIATION THERAPY FLDS PLACEMENT: CPT | Performed by: RADIOLOGY

## 2023-11-20 PROCEDURE — 80048 BASIC METABOLIC PNL TOTAL CA: CPT

## 2023-11-20 PROCEDURE — 85025 COMPLETE CBC W/AUTO DIFF WBC: CPT

## 2023-11-20 PROCEDURE — 36415 COLL VENOUS BLD VENIPUNCTURE: CPT

## 2023-11-20 PROCEDURE — 77385 HB NTSTY MODUL RAD TX DLVR SMPL: CPT | Performed by: RADIOLOGY

## 2023-11-21 ENCOUNTER — APPOINTMENT (OUTPATIENT)
Dept: RADIATION ONCOLOGY | Facility: CLINIC | Age: 78
End: 2023-11-21
Attending: RADIOLOGY
Payer: MEDICARE

## 2023-11-21 PROCEDURE — 77427 RADIATION TX MANAGEMENT X5: CPT | Performed by: RADIOLOGY

## 2023-11-21 PROCEDURE — 77014 CHG CT GUIDANCE RADIATION THERAPY FLDS PLACEMENT: CPT | Performed by: STUDENT IN AN ORGANIZED HEALTH CARE EDUCATION/TRAINING PROGRAM

## 2023-11-21 PROCEDURE — 77385 HB NTSTY MODUL RAD TX DLVR SMPL: CPT | Performed by: STUDENT IN AN ORGANIZED HEALTH CARE EDUCATION/TRAINING PROGRAM

## 2023-11-22 ENCOUNTER — APPOINTMENT (OUTPATIENT)
Dept: RADIATION ONCOLOGY | Facility: CLINIC | Age: 78
End: 2023-11-22
Attending: RADIOLOGY
Payer: MEDICARE

## 2023-11-22 PROCEDURE — 77014 CHG CT GUIDANCE RADIATION THERAPY FLDS PLACEMENT: CPT | Performed by: RADIOLOGY

## 2023-11-22 PROCEDURE — 77385 HB NTSTY MODUL RAD TX DLVR SMPL: CPT | Performed by: RADIOLOGY

## 2023-11-24 ENCOUNTER — APPOINTMENT (OUTPATIENT)
Dept: RADIATION ONCOLOGY | Facility: CLINIC | Age: 78
End: 2023-11-24
Payer: MEDICARE

## 2023-11-27 ENCOUNTER — APPOINTMENT (OUTPATIENT)
Dept: RADIATION ONCOLOGY | Facility: CLINIC | Age: 78
End: 2023-11-27
Attending: RADIOLOGY
Payer: MEDICARE

## 2023-11-27 PROCEDURE — 77385 HB NTSTY MODUL RAD TX DLVR SMPL: CPT | Performed by: RADIOLOGY

## 2023-11-27 PROCEDURE — 77014 CHG CT GUIDANCE RADIATION THERAPY FLDS PLACEMENT: CPT | Performed by: RADIOLOGY

## 2023-11-28 ENCOUNTER — APPOINTMENT (OUTPATIENT)
Dept: RADIATION ONCOLOGY | Facility: CLINIC | Age: 78
End: 2023-11-28
Attending: RADIOLOGY
Payer: MEDICARE

## 2023-11-28 PROCEDURE — 77385 HB NTSTY MODUL RAD TX DLVR SMPL: CPT | Performed by: RADIOLOGY

## 2023-11-28 PROCEDURE — 77014 CHG CT GUIDANCE RADIATION THERAPY FLDS PLACEMENT: CPT | Performed by: RADIOLOGY

## 2023-11-29 ENCOUNTER — APPOINTMENT (OUTPATIENT)
Dept: RADIATION ONCOLOGY | Facility: CLINIC | Age: 78
End: 2023-11-29
Attending: RADIOLOGY
Payer: MEDICARE

## 2023-11-29 PROCEDURE — 77014 CHG CT GUIDANCE RADIATION THERAPY FLDS PLACEMENT: CPT | Performed by: RADIOLOGY

## 2023-11-29 PROCEDURE — 77336 RADIATION PHYSICS CONSULT: CPT | Performed by: RADIOLOGY

## 2023-11-29 PROCEDURE — 77385 HB NTSTY MODUL RAD TX DLVR SMPL: CPT | Performed by: RADIOLOGY

## 2023-11-30 ENCOUNTER — APPOINTMENT (OUTPATIENT)
Dept: RADIATION ONCOLOGY | Facility: CLINIC | Age: 78
End: 2023-11-30
Attending: RADIOLOGY
Payer: MEDICARE

## 2023-11-30 PROCEDURE — 77427 RADIATION TX MANAGEMENT X5: CPT | Performed by: RADIOLOGY

## 2023-11-30 PROCEDURE — 77014 CHG CT GUIDANCE RADIATION THERAPY FLDS PLACEMENT: CPT | Performed by: RADIOLOGY

## 2023-11-30 PROCEDURE — 77385 HB NTSTY MODUL RAD TX DLVR SMPL: CPT | Performed by: RADIOLOGY

## 2023-12-01 ENCOUNTER — APPOINTMENT (OUTPATIENT)
Dept: RADIATION ONCOLOGY | Facility: CLINIC | Age: 78
End: 2023-12-01
Attending: RADIOLOGY
Payer: MEDICARE

## 2023-12-01 PROCEDURE — 77014 CHG CT GUIDANCE RADIATION THERAPY FLDS PLACEMENT: CPT | Performed by: RADIOLOGY

## 2023-12-01 PROCEDURE — 77385 HB NTSTY MODUL RAD TX DLVR SMPL: CPT | Performed by: RADIOLOGY

## 2023-12-04 ENCOUNTER — APPOINTMENT (OUTPATIENT)
Dept: RADIATION ONCOLOGY | Facility: CLINIC | Age: 78
End: 2023-12-04
Attending: RADIOLOGY
Payer: MEDICARE

## 2023-12-04 PROCEDURE — 77385 HB NTSTY MODUL RAD TX DLVR SMPL: CPT | Performed by: RADIOLOGY

## 2023-12-04 PROCEDURE — 77014 CHG CT GUIDANCE RADIATION THERAPY FLDS PLACEMENT: CPT | Performed by: RADIOLOGY

## 2023-12-05 ENCOUNTER — APPOINTMENT (OUTPATIENT)
Dept: RADIATION ONCOLOGY | Facility: CLINIC | Age: 78
End: 2023-12-05
Attending: RADIOLOGY
Payer: MEDICARE

## 2023-12-05 PROCEDURE — 77014 CHG CT GUIDANCE RADIATION THERAPY FLDS PLACEMENT: CPT | Performed by: STUDENT IN AN ORGANIZED HEALTH CARE EDUCATION/TRAINING PROGRAM

## 2023-12-05 PROCEDURE — 77385 HB NTSTY MODUL RAD TX DLVR SMPL: CPT | Performed by: STUDENT IN AN ORGANIZED HEALTH CARE EDUCATION/TRAINING PROGRAM

## 2023-12-06 ENCOUNTER — APPOINTMENT (OUTPATIENT)
Dept: RADIATION ONCOLOGY | Facility: CLINIC | Age: 78
End: 2023-12-06
Attending: RADIOLOGY
Payer: MEDICARE

## 2023-12-06 DIAGNOSIS — C61 PROSTATE CANCER (HCC): Primary | ICD-10-CM

## 2023-12-06 PROCEDURE — 77385 HB NTSTY MODUL RAD TX DLVR SMPL: CPT | Performed by: RADIOLOGY

## 2023-12-06 PROCEDURE — 77014 CHG CT GUIDANCE RADIATION THERAPY FLDS PLACEMENT: CPT | Performed by: RADIOLOGY

## 2023-12-06 PROCEDURE — 77336 RADIATION PHYSICS CONSULT: CPT | Performed by: RADIOLOGY

## 2023-12-06 RX ORDER — OXYBUTYNIN CHLORIDE 5 MG/1
5 TABLET ORAL 3 TIMES DAILY PRN
Qty: 60 TABLET | Refills: 1 | Status: SHIPPED | OUTPATIENT
Start: 2023-12-06

## 2023-12-07 ENCOUNTER — APPOINTMENT (OUTPATIENT)
Dept: RADIATION ONCOLOGY | Facility: CLINIC | Age: 78
End: 2023-12-07
Attending: RADIOLOGY
Payer: MEDICARE

## 2023-12-07 PROCEDURE — 77385 HB NTSTY MODUL RAD TX DLVR SMPL: CPT | Performed by: STUDENT IN AN ORGANIZED HEALTH CARE EDUCATION/TRAINING PROGRAM

## 2023-12-07 PROCEDURE — 77427 RADIATION TX MANAGEMENT X5: CPT | Performed by: RADIOLOGY

## 2023-12-07 PROCEDURE — 77014 CHG CT GUIDANCE RADIATION THERAPY FLDS PLACEMENT: CPT | Performed by: STUDENT IN AN ORGANIZED HEALTH CARE EDUCATION/TRAINING PROGRAM

## 2023-12-08 ENCOUNTER — APPOINTMENT (OUTPATIENT)
Dept: RADIATION ONCOLOGY | Facility: CLINIC | Age: 78
End: 2023-12-08
Attending: RADIOLOGY
Payer: MEDICARE

## 2023-12-08 PROCEDURE — 77014 CHG CT GUIDANCE RADIATION THERAPY FLDS PLACEMENT: CPT | Performed by: INTERNAL MEDICINE

## 2023-12-08 PROCEDURE — 77385 HB NTSTY MODUL RAD TX DLVR SMPL: CPT | Performed by: INTERNAL MEDICINE

## 2023-12-11 ENCOUNTER — APPOINTMENT (OUTPATIENT)
Dept: RADIATION ONCOLOGY | Facility: CLINIC | Age: 78
End: 2023-12-11
Attending: RADIOLOGY
Payer: MEDICARE

## 2023-12-11 PROCEDURE — 77385 HB NTSTY MODUL RAD TX DLVR SMPL: CPT | Performed by: RADIOLOGY

## 2023-12-11 PROCEDURE — 77014 CHG CT GUIDANCE RADIATION THERAPY FLDS PLACEMENT: CPT | Performed by: RADIOLOGY

## 2023-12-12 ENCOUNTER — APPOINTMENT (OUTPATIENT)
Dept: RADIATION ONCOLOGY | Facility: CLINIC | Age: 78
End: 2023-12-12
Attending: RADIOLOGY
Payer: MEDICARE

## 2023-12-12 PROCEDURE — 77385 HB NTSTY MODUL RAD TX DLVR SMPL: CPT | Performed by: RADIOLOGY

## 2023-12-12 PROCEDURE — 77014 CHG CT GUIDANCE RADIATION THERAPY FLDS PLACEMENT: CPT | Performed by: RADIOLOGY

## 2023-12-13 ENCOUNTER — APPOINTMENT (OUTPATIENT)
Dept: RADIATION ONCOLOGY | Facility: CLINIC | Age: 78
End: 2023-12-13
Attending: RADIOLOGY
Payer: MEDICARE

## 2023-12-13 PROCEDURE — 77014 CHG CT GUIDANCE RADIATION THERAPY FLDS PLACEMENT: CPT | Performed by: RADIOLOGY

## 2023-12-13 PROCEDURE — 77385 HB NTSTY MODUL RAD TX DLVR SMPL: CPT | Performed by: RADIOLOGY

## 2023-12-13 PROCEDURE — 77336 RADIATION PHYSICS CONSULT: CPT | Performed by: RADIOLOGY

## 2023-12-14 ENCOUNTER — APPOINTMENT (OUTPATIENT)
Dept: RADIATION ONCOLOGY | Facility: CLINIC | Age: 78
End: 2023-12-14
Attending: RADIOLOGY
Payer: MEDICARE

## 2023-12-14 PROCEDURE — 77385 HB NTSTY MODUL RAD TX DLVR SMPL: CPT | Performed by: STUDENT IN AN ORGANIZED HEALTH CARE EDUCATION/TRAINING PROGRAM

## 2023-12-14 PROCEDURE — 77427 RADIATION TX MANAGEMENT X5: CPT | Performed by: RADIOLOGY

## 2023-12-14 PROCEDURE — 77014 CHG CT GUIDANCE RADIATION THERAPY FLDS PLACEMENT: CPT | Performed by: STUDENT IN AN ORGANIZED HEALTH CARE EDUCATION/TRAINING PROGRAM

## 2023-12-15 ENCOUNTER — APPOINTMENT (OUTPATIENT)
Dept: RADIATION ONCOLOGY | Facility: CLINIC | Age: 78
End: 2023-12-15
Attending: RADIOLOGY
Payer: MEDICARE

## 2023-12-15 PROCEDURE — 77014 CHG CT GUIDANCE RADIATION THERAPY FLDS PLACEMENT: CPT | Performed by: RADIOLOGY

## 2023-12-15 PROCEDURE — 77385 HB NTSTY MODUL RAD TX DLVR SMPL: CPT | Performed by: RADIOLOGY

## 2023-12-18 ENCOUNTER — APPOINTMENT (OUTPATIENT)
Dept: RADIATION ONCOLOGY | Facility: CLINIC | Age: 78
End: 2023-12-18
Attending: RADIOLOGY
Payer: MEDICARE

## 2023-12-18 PROCEDURE — 77014 CHG CT GUIDANCE RADIATION THERAPY FLDS PLACEMENT: CPT | Performed by: RADIOLOGY

## 2023-12-18 PROCEDURE — 77385 HB NTSTY MODUL RAD TX DLVR SMPL: CPT | Performed by: RADIOLOGY

## 2023-12-19 ENCOUNTER — APPOINTMENT (OUTPATIENT)
Dept: RADIATION ONCOLOGY | Facility: CLINIC | Age: 78
End: 2023-12-19
Attending: RADIOLOGY
Payer: MEDICARE

## 2023-12-19 PROCEDURE — 77385 HB NTSTY MODUL RAD TX DLVR SMPL: CPT | Performed by: STUDENT IN AN ORGANIZED HEALTH CARE EDUCATION/TRAINING PROGRAM

## 2023-12-19 PROCEDURE — 77014 CHG CT GUIDANCE RADIATION THERAPY FLDS PLACEMENT: CPT | Performed by: STUDENT IN AN ORGANIZED HEALTH CARE EDUCATION/TRAINING PROGRAM

## 2023-12-20 ENCOUNTER — APPOINTMENT (OUTPATIENT)
Dept: RADIATION ONCOLOGY | Facility: CLINIC | Age: 78
End: 2023-12-20
Attending: RADIOLOGY
Payer: MEDICARE

## 2023-12-20 PROCEDURE — 77014 CHG CT GUIDANCE RADIATION THERAPY FLDS PLACEMENT: CPT | Performed by: RADIOLOGY

## 2023-12-20 PROCEDURE — 77385 HB NTSTY MODUL RAD TX DLVR SMPL: CPT | Performed by: RADIOLOGY

## 2023-12-20 PROCEDURE — 77336 RADIATION PHYSICS CONSULT: CPT | Performed by: RADIOLOGY

## 2023-12-21 ENCOUNTER — APPOINTMENT (OUTPATIENT)
Dept: RADIATION ONCOLOGY | Facility: CLINIC | Age: 78
End: 2023-12-21
Attending: RADIOLOGY
Payer: MEDICARE

## 2023-12-21 PROCEDURE — 77385 HB NTSTY MODUL RAD TX DLVR SMPL: CPT | Performed by: RADIOLOGY

## 2023-12-21 PROCEDURE — 77014 CHG CT GUIDANCE RADIATION THERAPY FLDS PLACEMENT: CPT | Performed by: RADIOLOGY

## 2023-12-21 PROCEDURE — 77427 RADIATION TX MANAGEMENT X5: CPT | Performed by: RADIOLOGY

## 2023-12-22 ENCOUNTER — APPOINTMENT (OUTPATIENT)
Dept: RADIATION ONCOLOGY | Facility: CLINIC | Age: 78
End: 2023-12-22
Attending: RADIOLOGY
Payer: MEDICARE

## 2023-12-22 PROCEDURE — 77014 CHG CT GUIDANCE RADIATION THERAPY FLDS PLACEMENT: CPT | Performed by: RADIOLOGY

## 2023-12-22 PROCEDURE — 77385 HB NTSTY MODUL RAD TX DLVR SMPL: CPT | Performed by: RADIOLOGY

## 2023-12-26 ENCOUNTER — APPOINTMENT (OUTPATIENT)
Dept: RADIATION ONCOLOGY | Facility: CLINIC | Age: 78
End: 2023-12-26
Attending: RADIOLOGY
Payer: MEDICARE

## 2023-12-26 PROCEDURE — 77385 HB NTSTY MODUL RAD TX DLVR SMPL: CPT | Performed by: RADIOLOGY

## 2023-12-26 PROCEDURE — 77014 CHG CT GUIDANCE RADIATION THERAPY FLDS PLACEMENT: CPT | Performed by: RADIOLOGY

## 2023-12-27 ENCOUNTER — APPOINTMENT (OUTPATIENT)
Dept: RADIATION ONCOLOGY | Facility: CLINIC | Age: 78
End: 2023-12-27
Attending: RADIOLOGY
Payer: MEDICARE

## 2023-12-27 PROCEDURE — 77014 CHG CT GUIDANCE RADIATION THERAPY FLDS PLACEMENT: CPT | Performed by: RADIOLOGY

## 2023-12-27 PROCEDURE — 77385 HB NTSTY MODUL RAD TX DLVR SMPL: CPT | Performed by: RADIOLOGY

## 2023-12-28 ENCOUNTER — APPOINTMENT (OUTPATIENT)
Dept: RADIATION ONCOLOGY | Facility: CLINIC | Age: 78
End: 2023-12-28
Attending: RADIOLOGY
Payer: MEDICARE

## 2023-12-28 PROCEDURE — 77014 CHG CT GUIDANCE RADIATION THERAPY FLDS PLACEMENT: CPT | Performed by: RADIOLOGY

## 2023-12-28 PROCEDURE — 77336 RADIATION PHYSICS CONSULT: CPT | Performed by: RADIOLOGY

## 2023-12-28 PROCEDURE — 77385 HB NTSTY MODUL RAD TX DLVR SMPL: CPT | Performed by: RADIOLOGY

## 2023-12-29 ENCOUNTER — APPOINTMENT (OUTPATIENT)
Dept: RADIATION ONCOLOGY | Facility: CLINIC | Age: 78
End: 2023-12-29
Attending: RADIOLOGY
Payer: MEDICARE

## 2023-12-29 PROCEDURE — 77427 RADIATION TX MANAGEMENT X5: CPT | Performed by: RADIOLOGY

## 2023-12-29 PROCEDURE — 77014 CHG CT GUIDANCE RADIATION THERAPY FLDS PLACEMENT: CPT | Performed by: RADIOLOGY

## 2023-12-29 PROCEDURE — 77385 HB NTSTY MODUL RAD TX DLVR SMPL: CPT | Performed by: RADIOLOGY

## 2024-01-02 ENCOUNTER — APPOINTMENT (OUTPATIENT)
Dept: RADIATION ONCOLOGY | Facility: CLINIC | Age: 79
End: 2024-01-02
Attending: RADIOLOGY
Payer: MEDICARE

## 2024-01-02 PROCEDURE — 77014 CHG CT GUIDANCE RADIATION THERAPY FLDS PLACEMENT: CPT | Performed by: STUDENT IN AN ORGANIZED HEALTH CARE EDUCATION/TRAINING PROGRAM

## 2024-01-02 PROCEDURE — 77385 HB NTSTY MODUL RAD TX DLVR SMPL: CPT | Performed by: STUDENT IN AN ORGANIZED HEALTH CARE EDUCATION/TRAINING PROGRAM

## 2024-01-03 ENCOUNTER — APPOINTMENT (OUTPATIENT)
Dept: RADIATION ONCOLOGY | Facility: CLINIC | Age: 79
End: 2024-01-03
Attending: RADIOLOGY
Payer: MEDICARE

## 2024-01-03 PROCEDURE — 77385 HB NTSTY MODUL RAD TX DLVR SMPL: CPT | Performed by: RADIOLOGY

## 2024-01-03 PROCEDURE — 77014 CHG CT GUIDANCE RADIATION THERAPY FLDS PLACEMENT: CPT | Performed by: RADIOLOGY

## 2024-01-04 ENCOUNTER — APPOINTMENT (OUTPATIENT)
Dept: RADIATION ONCOLOGY | Facility: CLINIC | Age: 79
End: 2024-01-04
Attending: RADIOLOGY
Payer: MEDICARE

## 2024-01-04 PROCEDURE — 77014 CHG CT GUIDANCE RADIATION THERAPY FLDS PLACEMENT: CPT | Performed by: STUDENT IN AN ORGANIZED HEALTH CARE EDUCATION/TRAINING PROGRAM

## 2024-01-04 PROCEDURE — 77336 RADIATION PHYSICS CONSULT: CPT | Performed by: RADIOLOGY

## 2024-01-04 PROCEDURE — 77385 HB NTSTY MODUL RAD TX DLVR SMPL: CPT | Performed by: STUDENT IN AN ORGANIZED HEALTH CARE EDUCATION/TRAINING PROGRAM

## 2024-01-05 ENCOUNTER — APPOINTMENT (OUTPATIENT)
Dept: RADIATION ONCOLOGY | Facility: CLINIC | Age: 79
End: 2024-01-05
Attending: RADIOLOGY
Payer: MEDICARE

## 2024-01-31 ENCOUNTER — TELEMEDICINE (OUTPATIENT)
Dept: RADIATION ONCOLOGY | Facility: CLINIC | Age: 79
End: 2024-01-31
Attending: RADIOLOGY

## 2024-01-31 DIAGNOSIS — C61 PROSTATE CANCER (HCC): Primary | ICD-10-CM

## 2024-01-31 PROCEDURE — 99024 POSTOP FOLLOW-UP VISIT: CPT | Performed by: RADIOLOGY

## 2024-01-31 NOTE — PROGRESS NOTES
Virtual Brief Visit    This Visit is being completed by telephone. The Patient is located at Home and in the following state in which I hold an active license [place]    The patient was identified by name and date of birth. Peter Kaiser was informed that this is a telemedicine visit and that the visit is being conducted through Telephone.  My office door was closed. No one else was in the room.  He acknowledged consent and understanding of privacy and security of the video platform. The patient has agreed to participate and understands they can discontinue the visit at any time.    Peter Kaiser is a 78 y.o. man with excellent performance status and minimal comorbidities with a history of clinical T2aN0 high-volume Adan score 6 (3+3) prostate cancer with a pretreatment PSA of 24.24 ng/mL.  He completed a course of definitive radiation with concurrent ADT on 1/4/2024. We called him and his son today for follow-up evaluation.    The patient feels well and has recovered from radiation.  He is no longer taking oxybutinin.  Frequency and urgency significant improved.  He denies dysuria, hematuria, incontinence.  He is able to empty bladder without difficulty.  He denies diarrhea or rectal bleeding.    He has PSA with follow-up with Urology scheduled in March 2024.    I have asked him to follow-up in 6 months.  We will see him sooner should the need arise.    Problem List Items Addressed This Visit          Genitourinary    Prostate cancer (HCC) - Primary       Recent Visits  No visits were found meeting these conditions.  Showing recent visits within past 7 days and meeting all other requirements  Future Appointments  No visits were found meeting these conditions.  Showing future appointments within next 150 days and meeting all other requirements         Visit Time  Total Visit Duration: 10

## 2024-03-15 DIAGNOSIS — C61 PROSTATE CANCER (HCC): Primary | ICD-10-CM

## 2024-03-16 ENCOUNTER — APPOINTMENT (OUTPATIENT)
Dept: LAB | Facility: CLINIC | Age: 79
End: 2024-03-16
Payer: MEDICARE

## 2024-03-16 DIAGNOSIS — C61 PROSTATE CANCER (HCC): ICD-10-CM

## 2024-03-16 LAB — PSA SERPL-MCNC: 0.02 NG/ML (ref 0–4)

## 2024-03-16 PROCEDURE — 84153 ASSAY OF PSA TOTAL: CPT

## 2024-03-16 PROCEDURE — 36415 COLL VENOUS BLD VENIPUNCTURE: CPT

## 2024-03-18 ENCOUNTER — TELEPHONE (OUTPATIENT)
Dept: RADIATION ONCOLOGY | Facility: CLINIC | Age: 79
End: 2024-03-18

## 2024-03-18 NOTE — TELEPHONE ENCOUNTER
----- Message from Shi Vera MD sent at 3/17/2024  3:54 PM EDT -----  Mirza Curran,    Please let patient know PSA fall is good, he should be following with Urology this month as well.  Thanks.    Shi  ----- Message -----  From: Lab, Background User  Sent: 3/16/2024   2:34 PM EDT  To: Shi Vera MD

## 2024-03-21 ENCOUNTER — OFFICE VISIT (OUTPATIENT)
Dept: UROLOGY | Facility: CLINIC | Age: 79
End: 2024-03-21
Payer: MEDICARE

## 2024-03-21 VITALS
BODY MASS INDEX: 31.43 KG/M2 | SYSTOLIC BLOOD PRESSURE: 124 MMHG | WEIGHT: 207.4 LBS | OXYGEN SATURATION: 98 % | HEART RATE: 87 BPM | DIASTOLIC BLOOD PRESSURE: 76 MMHG | HEIGHT: 68 IN

## 2024-03-21 DIAGNOSIS — C61 PROSTATE CANCER (HCC): Primary | ICD-10-CM

## 2024-03-21 DIAGNOSIS — Z79.818 ANDROGEN DEPRIVATION THERAPY: ICD-10-CM

## 2024-03-21 PROCEDURE — 99213 OFFICE O/P EST LOW 20 MIN: CPT | Performed by: PHYSICIAN ASSISTANT

## 2024-03-21 PROCEDURE — 96402 CHEMO HORMON ANTINEOPL SQ/IM: CPT

## 2024-03-21 RX ORDER — TRIAMCINOLONE ACETONIDE 55 UG/1
SPRAY, METERED NASAL
COMMUNITY
Start: 2023-12-07

## 2024-03-21 RX ORDER — LANOLIN ALCOHOL/MO/W.PET/CERES
1 CREAM (GRAM) TOPICAL 2 TIMES DAILY WITH MEALS
Qty: 180 TABLET | Refills: 3 | Status: SHIPPED | OUTPATIENT
Start: 2024-03-21 | End: 2025-03-16

## 2024-03-21 NOTE — PROGRESS NOTES
3/21/2024      Chief Complaint   Patient presents with    Follow-up     Prostate cancer         Assessment and Plan    79 y.o. male     1. Prostate cancer with PSA of 24  - Saw rad onc (8/28/23) with recommendations of ADT for 18-24 months and radiation   - Firmagon given (9/7/23)  - Lupron given (10/5/23)  - Due for Lupron today (3/21/24) this was given  - Discussed weight bearing exercises and vitamin D and calcium  - Follow up in 6 months with additional PSA prior to visit for next Lupron  - Continue following with rad onc  - Call with any questions or concerns in the meantime  - All questions answered; patient understands and agrees with plan       History of Present Illness  Peter Kaiser is a 79 y.o. male patient with history of prostate cancer s/p radiation here for follow up.     Patient with PSA of 24. Recommendations from rad onc and urology MD to have ADT for 18-24 months with radiation. Patient had firmagon followed by lupron. Presents today for next Lupron. Denies new or worsening symptoms. Has not been taking vitamins.     Review of Systems   Constitutional:  Negative for activity change, appetite change, chills and fever.   HENT:  Negative for congestion and trouble swallowing.    Respiratory:  Negative for cough and shortness of breath.    Cardiovascular:  Negative for chest pain, palpitations and leg swelling.   Gastrointestinal:  Negative for abdominal pain, constipation, diarrhea, nausea and vomiting.   Genitourinary:  Negative for difficulty urinating, dysuria, flank pain, frequency, hematuria and urgency.   Musculoskeletal:  Negative for back pain and gait problem.   Skin:  Negative for wound.   Allergic/Immunologic: Negative for immunocompromised state.   Neurological:  Negative for dizziness and syncope.   Hematological:  Does not bruise/bleed easily.   Psychiatric/Behavioral:  Negative for confusion.    All other systems reviewed and are negative.      Vitals  Vitals:    03/21/24 0927  "  Weight: 94.1 kg (207 lb 6.4 oz)   Height: 5' 8\" (1.727 m)       Physical Exam  Constitutional:       General: He is not in acute distress.     Appearance: Normal appearance. He is not ill-appearing, toxic-appearing or diaphoretic.   HENT:      Head: Normocephalic.      Nose: No congestion.   Eyes:      General: No scleral icterus.        Right eye: No discharge.         Left eye: No discharge.      Conjunctiva/sclera: Conjunctivae normal.      Pupils: Pupils are equal, round, and reactive to light.   Pulmonary:      Effort: Pulmonary effort is normal.   Musculoskeletal:      Cervical back: Normal range of motion.   Skin:     General: Skin is warm and dry.      Coloration: Skin is not jaundiced or pale.      Findings: No bruising, erythema, lesion or rash.   Neurological:      General: No focal deficit present.      Mental Status: He is alert and oriented to person, place, and time. Mental status is at baseline.      Gait: Gait normal.   Psychiatric:         Mood and Affect: Mood normal.         Behavior: Behavior normal.         Thought Content: Thought content normal.         Judgment: Judgment normal.           Past History  Past Medical History:   Diagnosis Date    Asthma     Hyperlipidemia     Prostate cancer (HCC)      Social History     Socioeconomic History    Marital status: /Civil Union     Spouse name: None    Number of children: None    Years of education: None    Highest education level: None   Occupational History    None   Tobacco Use    Smoking status: Former     Current packs/day: 0.00     Average packs/day: 1.5 packs/day for 27.0 years (40.5 ttl pk-yrs)     Types: Cigarettes     Start date:      Quit date:      Years since quittin.2     Passive exposure: Past    Smokeless tobacco: Former     Types: Chew     Quit date:    Vaping Use    Vaping status: Never Used   Substance and Sexual Activity    Alcohol use: Yes     Alcohol/week: 4.0 standard drinks of alcohol     Types: 4 " Cans of beer per week    Drug use: Never    Sexual activity: Not Currently   Other Topics Concern    None   Social History Narrative    None     Social Determinants of Health     Financial Resource Strain: Not on file   Food Insecurity: Not on file   Transportation Needs: Not on file   Physical Activity: Not on file   Stress: Not on file   Social Connections: Not on file   Intimate Partner Violence: Not on file   Housing Stability: Not on file     Social History     Tobacco Use   Smoking Status Former    Current packs/day: 0.00    Average packs/day: 1.5 packs/day for 27.0 years (40.5 ttl pk-yrs)    Types: Cigarettes    Start date:     Quit date:     Years since quittin.2    Passive exposure: Past   Smokeless Tobacco Former    Types: Chew    Quit date:      Family History   Problem Relation Age of Onset    Heart disease Mother        The following portions of the patient's history were reviewed and updated as appropriate: allergies, current medications, past medical history, past social history, past surgical history and problem list.    Results  No results found for this or any previous visit (from the past 1 hour(s)).]  Lab Results   Component Value Date    PSA 0.02 2024    PSA 0.91 2023     Lab Results   Component Value Date    CALCIUM 9.6 2023    K 5.3 2023    CO2 28 2023     2023    BUN 33 (H) 2023    CREATININE 1.24 2023     Lab Results   Component Value Date    WBC 5.27 2023    HGB 13.3 2023    HCT 38.9 2023    MCV 93 2023     2023       Melissa Roberts PA-C

## 2024-07-23 ENCOUNTER — OFFICE VISIT (OUTPATIENT)
Dept: RADIATION ONCOLOGY | Facility: CLINIC | Age: 79
End: 2024-07-23
Attending: RADIOLOGY
Payer: MEDICARE

## 2024-07-23 VITALS
RESPIRATION RATE: 16 BRPM | WEIGHT: 204 LBS | SYSTOLIC BLOOD PRESSURE: 120 MMHG | DIASTOLIC BLOOD PRESSURE: 82 MMHG | HEART RATE: 73 BPM | TEMPERATURE: 97 F | BODY MASS INDEX: 31.02 KG/M2 | OXYGEN SATURATION: 96 %

## 2024-07-23 DIAGNOSIS — C61 PROSTATE CANCER (HCC): Primary | ICD-10-CM

## 2024-07-23 PROCEDURE — 99211 OFF/OP EST MAY X REQ PHY/QHP: CPT | Performed by: RADIOLOGY

## 2024-07-23 PROCEDURE — 99213 OFFICE O/P EST LOW 20 MIN: CPT | Performed by: RADIOLOGY

## 2024-07-23 NOTE — PROGRESS NOTES
Peter Kaiser 1945 is a 79 y.o. male with excellent performance status and minimal comorbidities with a history of clinical T2aN0 high-volume Wagener score 6 (3+3) prostate cancer with a pretreatment PSA of 24.24 ng/mL.  He recently completed a course of definitive radiation therapy on 24 with concurrent ADT. He presents today for follow up.      3/21/24 Urology, [facility]  1. Prostate cancer with PSA of 24  - Saw rad onc (23) with recommendations of ADT for 18-24 months and radiation   - Firmagon given (23)  - Lupron given (10/5/23)  - Due for Lupron today (3/21/24) this was given  - Discussed weight bearing exercises and vitamin D and calcium  - Follow up in 6 months with additional PSA prior to visit for next Lupron  - Continue following with rad onc  - Call with any questions or concerns in the meantime  - All questions answered; patient understands and agrees with plan         PSA   Latest Ref Rng 0.00 - 4.00 ng/mL   2023 0.91    3/16/2024 0.02        [facility]    Follow up visit     Oncology History   Prostate cancer (HCC)   2023 Biopsy    A. Prostate, Left, needle Biopsy:   - Prostatic adenocarcinoma,   -Adan grade 3 +3 = score  6  -Tumor involves five of total seven submitted core  -Tumor involves approximately 15%, 25%, 40%, 55%, 80% of each core biopsy  - Perineural invasion: Present.         B. Prostate, Right, needle Biopsy:   - Prostatic adenocarcinoma,   -[place] grade 3 +3 = score  6  -Tumor involves three of total six submitted core  -Tumor involves approximately 5%,15%, 25% of each core biopsy  - Perineural invasion: Present.      2023 -  Cancer Staged    Staging form: Prostate, AJCC 8th Edition  - Clinical stage from 2023: Stage IIIA (cT2a, cN0, cM0, PSA: 24.2, Grade Group: 1) - Signed by Shi Vera MD on 2023  Stage prefix: Initial diagnosis  Prostate specific antigen (PSA) range: 20 or greater  [place] primary pattern: 3  Adan  secondary pattern: 3  Histologic grading system: 5 grade system       8/23/2023 Initial Diagnosis    Prostate cancer (HCC)     9/7/2023 -  Hormone Therapy    Firmagon 240 mg     10/5/2023 -  Hormone Therapy    Lupron 45 mg     11/1/2023 - 1/4/2024 Radiation    Treatments:  Course: C1  Plan ID Energy Fractions Dose per Fraction (cGy) Dose Correction (cGy) Total Dose Delivered (cGy) Elapsed Days   Prostate CD 10X 19 / 19 180 0 3,420 28   Prostate SV 10X 25 / 25 180 0 4,500 35    Treatment Dates:  11/1/2023 - 1/4/2024.      3/21/2024 -  Hormone Therapy    Lupron 45 mg         Review of Systems:  Review of Systems   Constitutional: Negative.    HENT:  Positive for hearing loss.    Eyes: Negative.    Respiratory: Negative.     Cardiovascular: Negative.    Gastrointestinal: Negative.    Genitourinary: Negative.    Musculoskeletal:  Positive for arthralgias (left knee pain).   Skin: Negative.    Allergic/Immunologic: Negative.    Neurological: Negative.    Hematological: Negative.    Psychiatric/Behavioral:  Positive for sleep disturbance.        Clinical Trial: no    IPSS Questionnaire (AUA-7):  Over the past month…    1)  How often have you had a sensation of not emptying your bladder completely after you finish urinating?  0 - Not at all   2)  How often have you had to urinate again less than two hours after you finished urinating? 0 - Not at all   3)  How often have you found you stopped and started again several times when you urinated?  0 - Not at all   4) How difficult have you found it to postpone urination?  0 - Not at all   5) How often have you had a weak urinary stream?  0 - Not at all   6) How often have you had to push or strain to begin urination?  0 - Not at all   7) How many times did you most typically get up to urinate from the time you went to bed until the time you got up in the morning?  3 - 3 times   Total Score:  3           Health Maintenance   Topic Date Due    Hepatitis C Screening  Never done     Medicare Annual Wellness Visit (AWV)  Never done    Pneumococcal Vaccine: 65+ Years (1 of 2 - PCV) Never done    BMI: Followup Plan  Never done    Zoster Vaccine (1 of 2) Never done    RSV Vaccine Age 60+ Years (1 - 1-dose 60+ series) Never done    Fall Risk  Never done    COVID-19 Vaccine (2 - Moderna risk series) 2021    Depression Screening  2024    Influenza Vaccine (1) 2024    BMI: Adult  2025    RSV Vaccine age 0-20 Months  Aged Out    HIB Vaccine  Aged Out    IPV Vaccine  Aged Out    Hepatitis A Vaccine  Aged Out    Meningococcal ACWY Vaccine  Aged Out    HPV Vaccine  Aged Out     Patient Active Problem List   Diagnosis    Prostate cancer (HCC)    Encounter for support and coordination of transition of care    Androgen deprivation therapy     Past Medical History:   Diagnosis Date    Asthma     Hyperlipidemia     Prostate cancer (HCC)      Past Surgical History:   Procedure Laterality Date    CATARACT EXTRACTION Bilateral     TOOTH EXTRACTION      US GUIDED PROSTATE BIOPSY  2023     Family History   Problem Relation Age of Onset    Heart disease Mother      Social History     Socioeconomic History    Marital status: /Civil Union     Spouse name: Not on file    Number of children: Not on file    Years of education: Not on file    Highest education level: Not on file   Occupational History    Not on file   Tobacco Use    Smoking status: Former     Current packs/day: 0.00     Average packs/day: 1.5 packs/day for 27.0 years (40.5 ttl pk-yrs)     Types: Cigarettes     Start date:      Quit date:      Years since quittin.5     Passive exposure: Past    Smokeless tobacco: Former     Types: Chew     Quit date:    Vaping Use    Vaping status: Never Used   Substance and Sexual Activity    Alcohol use: Yes     Alcohol/week: 4.0 standard drinks of alcohol     Types: 4 Cans of beer per week    Drug use: Never    Sexual activity: Not Currently   Other Topics Concern     Not on file   Social History Narrative    Not on file     Social Determinants of Health     Financial Resource Strain: Not on file   Food Insecurity: Not on file   Transportation Needs: Not on file   Physical Activity: Not on file   Stress: Not on file   Social Connections: Not on file   Intimate Partner Violence: Not on file   Housing Stability: Not on file       Current Outpatient Medications:     albuterol (PROVENTIL HFA,VENTOLIN HFA) 90 mcg/act inhaler, as needed, Disp: , Rfl:     Calcium Carb-Cholecalciferol (calcium carbonate-vitamin D) 500 mg-5 mcg tablet, Take 1 tablet by mouth 2 (two) times a day with meals, Disp: 180 tablet, Rfl: 3    fluticasone-umeclidinium-vilanterol 200-62.5-25 mcg/actuation AEPB inhaler, Inhale 1 puff daily Rinse mouth after use. (Patient not taking: Reported on 10/5/2023), Disp: , Rfl:     fluticasone-vilanterol 200-25 mcg/actuation inhaler, INHALE 1 PUFF DAILY. RINSE, GARGLE AND SPIT POST USE. (Patient not taking: Reported on 10/5/2023), Disp: , Rfl:     oxybutynin (DITROPAN) 5 mg tablet, Take 1 tablet (5 mg total) by mouth 3 (three) times a day as needed (urinary frequency and urgency), Disp: 60 tablet, Rfl: 1    rosuvastatin (Crestor) 5 mg tablet, Take 5 mg by mouth daily, Disp: , Rfl:     Triamcinolone Acetonide (Nasacort Allergy) 55 MCG/ACT nasal spray, INSTILL 2 SPRAYS INTO EACH NOSTRIL NIGHTLY., Disp: , Rfl:   No Known Allergies  There were no vitals filed for this visit.

## 2024-07-26 NOTE — PROGRESS NOTES
Follow-up - Radiation Oncology   Peter Kaiser 1945 79 y.o. male [id number]      History of Present Illness   Cancer Staging   Prostate cancer (HCC)  Staging form: Prostate, AJCC 8th Edition  - Clinical stage from 2023: Stage IIIA (cT2a, cN0, cM0, PSA: 24.2, Grade Group: 1) - Signed by Shi Vera MD on 2023  Stage prefix: Initial diagnosis  Prostate specific antigen (PSA) range: 20 or greater  [place]an primary pattern: 3  Adan secondary pattern: 3  Histologic grading system: 5 grade system      Peter Kaiser man with excellent performance status and minimal comorbidities with a history of clinical T2aN0 high-volume Adan score 6 (3+3) prostate cancer with a pretreatment PSA of 24.24 ng/mL.  He recently completed a course of definitive radiation therapy on 24 with concurrent ADT. He presents today for follow up.        3/21/24 Urology, [facility]  1. Prostate cancer with PSA of 24  - Saw rad onc (23) with recommendations of ADT for 18-24 months and radiation   - Firmagon given (23)  - Lupron given (10/5/23)  - Due for Lupron today (3/21/24) this was given  - Discussed weight bearing exercises and vitamin D and calcium  - Follow up in 6 months with additional PSA prior to visit for next Lupron  - Continue following with rad onc  - Call with any questions or concerns in the meantime  - All questions answered; patient understands and agrees with plan         PSA   Latest Ref Rng 0.00 - 4.00 ng/mL   2023 0.91    3/16/2024 0.02      The patient denies significant hot flashes, gynecomastia, or new or worsening bone pain.  He denies dysuria, hematuria, incontinence.  His IPSS score is good at 3.  He does not take medication.  He denies rectal bleeding or diarrhea.    [facility] Urology       Historical Information   Oncology History   Prostate cancer (HCC)   2023 Biopsy    A. Prostate, Left, needle Biopsy:   - Prostatic adenocarcinoma,   -Adan grade 3 +3 = score   6  -Tumor involves five of total seven submitted core  -Tumor involves approximately 15%, 25%, 40%, 55%, 80% of each core biopsy  - Perineural invasion: Present.         B. Prostate, Right, needle Biopsy:   - Prostatic adenocarcinoma,   -Adan grade 3 +3 = score  6  -Tumor involves three of total six submitted core  -Tumor involves approximately 5%,15%, 25% of each core biopsy  - Perineural invasion: Present.      7/18/2023 -  Cancer Staged    Staging form: Prostate, AJCC 8th Edition  - Clinical stage from 7/18/2023: Stage IIIA (cT2a, cN0, cM0, PSA: 24.2, Grade Group: 1) - Signed by Shi Vera MD on 8/28/2023  Stage prefix: Initial diagnosis  Prostate specific antigen (PSA) range: 20 or greater  [place] primary pattern: 3  Adan secondary pattern: 3  Histologic grading system: 5 grade system       8/23/2023 Initial Diagnosis    Prostate cancer (HCC)     9/7/2023 -  Hormone Therapy    Firmagon 240 mg     10/5/2023 -  Hormone Therapy    Lupron 45 mg     11/1/2023 - 1/4/2024 Radiation    Treatments:  Course: C1  Plan ID Energy Fractions Dose per Fraction (cGy) Dose Correction (cGy) Total Dose Delivered (cGy) Elapsed Days   Prostate CD 10X 19 / 19 180 0 3,420 28   Prostate SV 10X 25 / 25 180 0 4,500 35    Treatment Dates:  11/1/2023 - 1/4/2024.      3/21/2024 -  Hormone Therapy    Lupron 45 mg         Past Medical History:   Diagnosis Date    Asthma     Hyperlipidemia     Prostate cancer (HCC)      Past Surgical History:   Procedure Laterality Date    CATARACT EXTRACTION Bilateral     TOOTH EXTRACTION      US GUIDED PROSTATE BIOPSY  07/18/2023       Social History   Social History     Substance and Sexual Activity   Alcohol Use Yes    Alcohol/week: 4.0 standard drinks of alcohol    Types: 4 Cans of beer per week     Social History     Substance and Sexual Activity   Drug Use Never     Social History     Tobacco Use   Smoking Status Former    Current packs/day: 0.00    Average packs/day: 1.5 packs/day for 27.0 years  (40.5 ttl pk-yrs)    Types: Cigarettes    Start date:     Quit date:     Years since quittin.5    Passive exposure: Past   Smokeless Tobacco Former    Types: Chew    Quit date:          Meds/Allergies     Current Outpatient Medications:     albuterol (PROVENTIL HFA,VENTOLIN HFA) 90 mcg/act inhaler, as needed, Disp: , Rfl:     Calcium Carb-Cholecalciferol (calcium carbonate-vitamin D) 500 mg-5 mcg tablet, Take 1 tablet by mouth 2 (two) times a day with meals, Disp: 180 tablet, Rfl: 3    Triamcinolone Acetonide (Nasacort Allergy) 55 MCG/ACT nasal spray, INSTILL 2 SPRAYS INTO EACH NOSTRIL NIGHTLY., Disp: , Rfl:     fluticasone-umeclidinium-vilanterol 200-62.5-25 mcg/actuation AEPB inhaler, Inhale 1 puff daily Rinse mouth after use. (Patient not taking: Reported on 10/5/2023), Disp: , Rfl:     fluticasone-vilanterol 200-25 mcg/actuation inhaler, INHALE 1 PUFF DAILY. RINSE, GARGLE AND SPIT POST USE. (Patient not taking: Reported on 10/5/2023), Disp: , Rfl:     oxybutynin (DITROPAN) 5 mg tablet, Take 1 tablet (5 mg total) by mouth 3 (three) times a day as needed (urinary frequency and urgency) (Patient not taking: Reported on 2024), Disp: 60 tablet, Rfl: 1    rosuvastatin (Crestor) 5 mg tablet, Take 5 mg by mouth daily (Patient not taking: Reported on 2024), Disp: , Rfl:   No Known Allergies      Review of Systems   Constitutional: Negative.    HENT:  Positive for hearing loss.    Eyes: Negative.    Respiratory: Negative.     Cardiovascular: Negative.    Gastrointestinal: Negative.    Genitourinary: Negative.    Musculoskeletal:  Positive for arthralgias (left knee pain).   Skin: Negative.    Allergic/Immunologic: Negative.    Neurological: Negative.    Hematological: Negative.    Psychiatric/Behavioral:  Positive for sleep disturbance.          OBJECTIVE:   /82   Pulse 73   Temp (!) 97 °F (36.1 °C)   Resp 16   Wt 92.5 kg (204 lb)   SpO2 96%   BMI 31.02 kg/m²   Karn[place]sky: 90 - Able  "to carry on normal activity; minor signs or symptoms of disease     Physical Exam  Vitals and nursing note reviewed.   Constitutional:       General: He is not in acute distress.     Appearance: He is well-developed.   Cardiovascular:      Rate and Rhythm: Normal rate and regular rhythm.   Pulmonary:      Breath sounds: No wheezing, rhonchi or rales.   Abdominal:      Palpations: Abdomen is soft.      Tenderness: There is no abdominal tenderness.   Musculoskeletal:      Right lower leg: No edema.      Left lower leg: No edema.   Lymphadenopathy:      Cervical: No cervical adenopathy.      Upper Body:      Right upper body: No supraclavicular adenopathy.      Left upper body: No supraclavicular adenopathy.   Neurological:      Mental Status: He is alert and oriented to person, place, and time.      Gait: Gait normal.            Assessment/Plan:  Peter Kaiser is a 79 y.o. man with a history of high risk prostate cancer status post ADT and definitive radiation completed January 2024.  He is scheduled to receive long term ADT for 18-24 months.  He tolerates this well.  He is currently biochemically DEBORA.  He has no significant GI or  toxicity.  He continues to follow Urology.  Follow-up in 6 months with PSA prior.      Shi Vera MD  7/26/2024,1:09 AM    Portions of the record may have been created with voice recognition software.  Occasional wrong word or \"sound a like\" substitutions may have occurred due to the inherent limitations of voice recognition software.  Read the chart carefully and recognize, using context, where substitutions have occurred.        "

## 2024-09-16 ENCOUNTER — TELEPHONE (OUTPATIENT)
Dept: UROLOGY | Facility: CLINIC | Age: 79
End: 2024-09-16

## 2024-09-18 ENCOUNTER — APPOINTMENT (OUTPATIENT)
Dept: LAB | Facility: CLINIC | Age: 79
End: 2024-09-18
Payer: MEDICARE

## 2024-09-18 DIAGNOSIS — C61 PROSTATE CANCER (HCC): ICD-10-CM

## 2024-09-18 PROCEDURE — 36415 COLL VENOUS BLD VENIPUNCTURE: CPT

## 2024-09-18 PROCEDURE — 84153 ASSAY OF PSA TOTAL: CPT

## 2024-09-19 LAB — PSA SERPL-MCNC: <0.008 NG/ML (ref 0–4)

## 2024-09-23 ENCOUNTER — OFFICE VISIT (OUTPATIENT)
Dept: UROLOGY | Facility: CLINIC | Age: 79
End: 2024-09-23
Payer: MEDICARE

## 2024-09-23 VITALS
RESPIRATION RATE: 16 BRPM | HEIGHT: 68 IN | SYSTOLIC BLOOD PRESSURE: 128 MMHG | BODY MASS INDEX: 31.43 KG/M2 | TEMPERATURE: 97.5 F | HEART RATE: 116 BPM | WEIGHT: 207.4 LBS | OXYGEN SATURATION: 95 % | DIASTOLIC BLOOD PRESSURE: 78 MMHG

## 2024-09-23 DIAGNOSIS — C61 PROSTATE CANCER (HCC): Primary | ICD-10-CM

## 2024-09-23 PROCEDURE — 96402 CHEMO HORMON ANTINEOPL SQ/IM: CPT

## 2024-09-23 PROCEDURE — 99213 OFFICE O/P EST LOW 20 MIN: CPT | Performed by: PHYSICIAN ASSISTANT

## 2024-09-23 NOTE — PROGRESS NOTES
9/23/2024      Chief Complaint   Patient presents with    Follow-up     6 month check          Assessment and Plan    79 y.o. male     1. High Volume Adan 6 prostate cancer  - Pretreatment PSA 24  - Radiation completed 1/4/24  - Undergoing ADT for 2 years. Lupron 3/4 given today  - PSA (9/18/24) <0.008  - Discussed weight bearing exercises and vitamin D and calcium  - Continue following with rad onc  - Follow up in 6 months for final Lupron. PSA prior to visit.   - Call with any questions or concerns in the meantime  - All questions answered; patient understands and agrees with plan       History of Present Illness  Peter Kaiser is a 79 y.o. male patient with history of prostate cancer s/p radiation here for follow up.      Patient with PSA of 24. Recommendations from rad onc and urology MD to have ADT for 18-24 months with radiation. Patient here today for Lupron 3/4, Lupron schedule as below. PSA undetectable. Denies new or worsening symptoms. Following with rad onc.    ADT:  - Firmagon (9/7/23)  - Lupron 1/4 (10/5/23)  - Lupron 2/4 (3/21/24)  - Lupron 3/4 given today    Review of Systems   Constitutional:  Negative for activity change, appetite change, chills and fever.   HENT:  Negative for congestion and trouble swallowing.    Respiratory:  Negative for cough and shortness of breath.    Cardiovascular:  Negative for chest pain, palpitations and leg swelling.   Gastrointestinal:  Negative for abdominal pain, constipation, diarrhea, nausea and vomiting.   Genitourinary:  Negative for difficulty urinating, dysuria, flank pain, frequency, hematuria and urgency.   Musculoskeletal:  Negative for back pain and gait problem.   Skin:  Negative for wound.   Allergic/Immunologic: Negative for immunocompromised state.   Neurological:  Negative for dizziness and syncope.   Hematological:  Does not bruise/bleed easily.   Psychiatric/Behavioral:  Negative for confusion.    All other systems reviewed and are  "negative.      Vitals  Vitals:    24 0908   BP: 128/78   BP Location: Left arm   Patient Position: Sitting   Cuff Size: Standard   Pulse: (!) 116   Resp: 16   Temp: 97.5 °F (36.4 °C)   SpO2: 95%   Weight: 94.1 kg (207 lb 6.4 oz)   Height: 5' 8\" (1.727 m)       Physical Exam  Constitutional:       General: He is not in acute distress.     Appearance: Normal appearance. He is not ill-appearing, toxic-appearing or diaphoretic.   HENT:      Head: Normocephalic.      Nose: No congestion.   Eyes:      General: No scleral icterus.        Right eye: No discharge.         Left eye: No discharge.      Conjunctiva/sclera: Conjunctivae normal.      Pupils: Pupils are equal, round, and reactive to light.   Pulmonary:      Effort: Pulmonary effort is normal.   Musculoskeletal:      Cervical back: Normal range of motion.   Skin:     General: Skin is warm and dry.      Coloration: Skin is not jaundiced or pale.      Findings: No bruising, erythema, lesion or rash.   Neurological:      General: No focal deficit present.      Mental Status: He is alert and oriented to person, place, and time. Mental status is at baseline.      Gait: Gait normal.   Psychiatric:         Mood and Affect: Mood normal.         Behavior: Behavior normal.         Thought Content: Thought content normal.         Judgment: Judgment normal.           Past History  Past Medical History:   Diagnosis Date    Asthma     Hyperlipidemia     Prostate cancer (HCC)      Social History     Socioeconomic History    Marital status: /Civil Union     Spouse name: None    Number of children: None    Years of education: None    Highest education level: None   Occupational History    None   Tobacco Use    Smoking status: Former     Current packs/day: 0.00     Average packs/day: 1.5 packs/day for 27.0 years (40.5 ttl pk-yrs)     Types: Cigarettes     Start date:      Quit date:      Years since quittin.7     Passive exposure: Past    Smokeless tobacco: " Former     Types: Chew     Quit date:    Vaping Use    Vaping status: Never Used   Substance and Sexual Activity    Alcohol use: Yes     Alcohol/week: 4.0 standard drinks of alcohol     Types: 4 Cans of beer per week    Drug use: Never    Sexual activity: Not Currently   Other Topics Concern    None   Social History Narrative    None     Social Determinants of Health     Financial Resource Strain: Not on file   Food Insecurity: Not on file   Transportation Needs: Not on file   Physical Activity: Not on file   Stress: Not on file   Social Connections: Not on file   Intimate Partner Violence: Not on file   Housing Stability: Not on file     Social History     Tobacco Use   Smoking Status Former    Current packs/day: 0.00    Average packs/day: 1.5 packs/day for 27.0 years (40.5 ttl pk-yrs)    Types: Cigarettes    Start date:     Quit date:     Years since quittin.7    Passive exposure: Past   Smokeless Tobacco Former    Types: Chew    Quit date:      Family History   Problem Relation Age of Onset    Heart disease Mother        The following portions of the patient's history were reviewed and updated as appropriate: allergies, current medications, past medical history, past social history, past surgical history and problem list.    Results  No results found for this or any previous visit (from the past 1 hour(s)).]  Lab Results   Component Value Date    PSA <0.008 2024    PSA 0.02 2024    PSA 0.91 2023     Lab Results   Component Value Date    CALCIUM 9.6 2023    K 5.3 2023    CO2 28 2023     2023    BUN 33 (H) 2023    CREATININE 1.24 2023     Lab Results   Component Value Date    WBC 5.27 2023    HGB 13.3 2023    HCT 38.9 2023    MCV 93 2023     2023       Melissa Roberts PA-C

## 2025-01-17 ENCOUNTER — OFFICE VISIT (OUTPATIENT)
Dept: RADIATION ONCOLOGY | Facility: CLINIC | Age: 80
End: 2025-01-17
Attending: RADIOLOGY
Payer: MEDICARE

## 2025-01-17 VITALS
RESPIRATION RATE: 16 BRPM | SYSTOLIC BLOOD PRESSURE: 140 MMHG | BODY MASS INDEX: 31.47 KG/M2 | HEART RATE: 92 BPM | DIASTOLIC BLOOD PRESSURE: 82 MMHG | OXYGEN SATURATION: 95 % | WEIGHT: 207 LBS | TEMPERATURE: 97 F

## 2025-01-17 DIAGNOSIS — C61 PROSTATE CANCER (HCC): Primary | ICD-10-CM

## 2025-01-17 PROBLEM — Z85.46 HISTORY OF PROSTATE CANCER: Status: ACTIVE | Noted: 2025-01-17

## 2025-01-17 PROBLEM — Z08 ENCOUNTER FOR FOLLOW-UP SURVEILLANCE OF PROSTATE CANCER: Status: ACTIVE | Noted: 2025-01-17

## 2025-01-17 PROBLEM — Z85.46 ENCOUNTER FOR FOLLOW-UP SURVEILLANCE OF PROSTATE CANCER: Status: ACTIVE | Noted: 2025-01-17

## 2025-01-17 PROCEDURE — 99211 OFF/OP EST MAY X REQ PHY/QHP: CPT | Performed by: RADIOLOGY

## 2025-01-17 PROCEDURE — 99213 OFFICE O/P EST LOW 20 MIN: CPT | Performed by: RADIOLOGY

## 2025-01-17 PROCEDURE — G2211 COMPLEX E/M VISIT ADD ON: HCPCS | Performed by: RADIOLOGY

## 2025-01-17 NOTE — PROGRESS NOTES
Follow-up Visit   Name: Peter Kaiser      : 1945      MRN: [id number]  Encounter Provider: Shi Vera MD  Encounter Date: 2025   Encounter department: [facility] RADIATION ONCOLOGY  :  Assessment & Plan  Prostate cancer (HCC)  Peter Kaiser 1945 is a 79 y.o. male with excellent performance status and minimal comorbidities with a history of clinical T2aN0 high-volume Adan score 6 (3+3) prostate cancer with a pretreatment PSA of 24.24 ng/mL.  He completed a course of definitive radiation therapy on 24 with concurrent ADT.     Clinically DEBORA prostate cancer.  -PSA scheduled 2025 with Urology follow-up afterwards.  Compliant with Urology FU.  -4th and final Lupron injection scheduled with Urology in 2025.  -FU 6 months    Slow urinary stream symptoms very mild.    -Discussed with patient that if concerned or bothered by slow stream, can trial tamsulosin.  Patient prefer no medication and overall feels well with  symptoms at this time.  I find this very reasonsable.     Patient notes some mild proximal leg weakness with continued activity.  He demonstrated normal gait and no issues with sitting to -standing during today's visit.  Suspect this may be related to long term ADT.    -Discussed that he could consider shortening course to 18 months, but deferred for standard 2 years.  -Recommended continued activity, mild weight resistance training, and encouraged activities that can help improve balance.  Could consider PT referral if persistent and he is willing.  -If symptoms progress, then recommend discussion with PCP for further evaluation and intervention.           History of Present Illness   Chief Complaint   Patient presents with    Prostate Cancer    Follow-up   Pertinent Medical History   Peter Kaiser 1945 is a 79 y.o. male with excellent performance status and minimal comorbidities with a history of clinical T2aN0 high-volume [place] score 6  "(3+3) prostate cancer with a pretreatment PSA of 24.24 ng/mL.  He completed a course of definitive radiation therapy on 1/4/24 with concurrent ADT. He was last seen 7/23/24 and presents today for follow up.      9/23/24 Urology, [facility]   High Volume [place] 6 prostate cancer  - Pretreatment PSA 24  - Radiation completed 1/4/24  - Undergoing ADT for 2 years. Lupron 3/4 given today  - PSA (9/18/24) <0.008  - Discussed weight bearing exercises and vitamin D and calcium  - Continue following with rad onc  - Follow up in 6 months for final Lupron. PSA prior to visit.        PSA   Latest Ref Rng 0.000 - 4.000 ng/mL   11/1/2023 0.91    3/16/2024 0.02    9/18/2024 <0.008      The patient denies significant hot flashes, gynecomastia, or new or worsening bone pain. He denies dysuria, hematuria, incontinence. His IPSS score is good at 1 (down from 3 in [place] 2024). He notes that he feels his stream is slower post radiation, but empties his bladder fully and without difficulty.  He denies increased intermittency.  He denies rectal bleeding or diarrhea.     He complains of \"my hips get tired\" after a period of walking.  He did not have this issue prior.  He denies weakness or pain, but he does feel he needs to stop and rest after walking a certain distance and feels more \"tired\" in this area after long periods of standing.  He notes this when working and hunting.  Denies focal numbness.    Upcoming:  3/13/25 Urology    Oncology History   Cancer Staging   Prostate cancer (HCC)  Staging form: Prostate, AJCC 8th Edition  - Clinical stage from 7/18/2023: Stage IIIA (cT2a, cN0, cM0, PSA: 24.2, Grade Group: 1) - Signed by Shi Vera MD on 8/28/2023  Stage prefix: Initial diagnosis  Prostate specific antigen (PSA) range: 20 or greater  Adan primary pattern: 3  [place] secondary pattern: 3  Histologic grading system: 5 grade system  Oncology History   Prostate cancer (HCC)   7/18/2023 Biopsy    A. Prostate, Left, needle Biopsy:   - " Prostatic adenocarcinoma,   -[place] grade 3 +3 = score  6  -Tumor involves five of total seven submitted core  -Tumor involves approximately 15%, 25%, 40%, 55%, 80% of each core biopsy  - Perineural invasion: Present.         B. Prostate, Right, needle Biopsy:   - Prostatic adenocarcinoma,   -Adan grade 3 +3 = score  6  -Tumor involves three of total six submitted core  -Tumor involves approximately 5%,15%, 25% of each core biopsy  - Perineural invasion: Present.      7/18/2023 -  Cancer Staged    Staging form: Prostate, AJCC 8th Edition  - Clinical stage from 7/18/2023: Stage IIIA (cT2a, cN0, cM0, PSA: 24.2, Grade Group: 1) - Signed by Shi Vera MD on 8/28/2023  Stage prefix: Initial diagnosis  Prostate specific antigen (PSA) range: 20 or greater  [place] primary pattern: 3  [place] secondary pattern: 3  Histologic grading system: 5 grade system       8/23/2023 Initial Diagnosis    Prostate cancer (HCC)     9/7/2023 -  Hormone Therapy    Firmagon 240 mg     10/5/2023 -  Hormone Therapy    Lupron 45 mg     11/1/2023 - 1/4/2024 Radiation    Treatments:  Course: C1  Plan ID Energy Fractions Dose per Fraction (cGy) Dose Correction (cGy) Total Dose Delivered (cGy) Elapsed Days   Prostate CD 10X 19 / 19 180 0 3,420 28   Prostate SV 10X 25 / 25 180 0 4,500 35    Treatment Dates:  11/1/2023 - 1/4/2024.      3/21/2024 -  Hormone Therapy    Lupron 45 mg     9/23/2024 -  Hormone Therapy    Lupron 45 mg        Review of Systems Refer to nursing note.    Current Outpatient Medications on File Prior to Visit   Medication Sig Dispense Refill    albuterol (PROVENTIL HFA,VENTOLIN HFA) 90 mcg/act inhaler as needed      Calcium Carb-Cholecalciferol (calcium carbonate-vitamin D) 500 mg-5 mcg tablet Take 1 tablet by mouth 2 (two) times a day with meals 180 tablet 3    Triamcinolone Acetonide (Nasacort Allergy) 55 MCG/ACT nasal spray INSTILL 2 SPRAYS INTO EACH NOSTRIL NIGHTLY.       No current facility-administered medications on  "file prior to visit.      Social History     Tobacco Use    Smoking status: Former     Current packs/day: 0.00     Average packs/day: 1.5 packs/day for 27.0 years (40.5 ttl pk-yrs)     Types: Cigarettes     Start date:      Quit date:      Years since quittin.0     Passive exposure: Past    Smokeless tobacco: Former     Types: Chew     Quit date:    Vaping Use    Vaping status: Never Used   Substance and Sexual Activity    Alcohol use: Not Currently     Alcohol/week: 4.0 standard drinks of alcohol     Types: 4 Cans of beer per week    Drug use: Never    Sexual activity: Not Currently         Objective   /82   Pulse 92   Temp (!) 97 °F (36.1 °C)   Resp 16   Wt 93.9 kg (207 lb)   SpO2 95%   BMI 31.47 kg/m²     Pain Screening:  Pain Score: 0-No pain  ECOG  1  Physical Exam  Vitals and nursing note reviewed.   Constitutional:       General: He is not in acute distress.  Cardiovascular:      Rate and Rhythm: Normal rate and regular rhythm.   Pulmonary:      Breath sounds: No wheezing, rhonchi or rales.   Abdominal:      Palpations: Abdomen is soft.      Tenderness: There is no abdominal tenderness. There is no right CVA tenderness or left CVA tenderness.   Musculoskeletal:         General: No tenderness (no spinal tenderness to deep palpation).      Right lower leg: No edema.      Left lower leg: No edema.   Lymphadenopathy:      Cervical: No cervical adenopathy.   Neurological:      Mental Status: He is alert and oriented to person, place, and time.      Gait: Gait normal.          Administrative Statements   I have spent a total time of 25 minutes in caring for this patient on the day of the visit/encounter including   Portions of the record may have been created with voice recognition software.  Occasional wrong word or \"sound a like\" substitutions may have occurred due to the inherent limitations of voice recognition software.  Read the chart carefully and recognize, using context, where " substitutions have occurred.

## 2025-01-17 NOTE — PROGRESS NOTES
Peter Kaiser 1945 is a 79 y.o. male with excellent performance status and minimal comorbidities with a history of clinical T2aN0 high-volume [place] score 6 (3+3) prostate cancer with a pretreatment PSA of 24.24 ng/mL.  He completed a course of definitive radiation therapy on 1/4/24 with concurrent ADT. He was last seen 7/23/24 and presents today for follow up.       9/23/24 Urology, [facility]   High Volume Adan 6 prostate cancer  - Pretreatment PSA 24  - Radiation completed 1/4/24  - Undergoing ADT for 2 years. Lupron 3/4 given today  - PSA (9/18/24) <0.008  - Discussed weight bearing exercises and vitamin D and calcium  - Continue following with rad onc  - Follow up in 6 months for final Lupron. PSA prior to visit.        PSA   Latest Ref Rng 0.000 - 4.000 ng/mL   11/1/2023 0.91    3/16/2024 0.02    9/18/2024 <0.008        Upcoming:  3/13/25 Urology    Follow up visit     Oncology History   Prostate cancer (HCC)   7/18/2023 Biopsy    A. Prostate, Left, needle Biopsy:   - Prostatic adenocarcinoma,   -Adan grade 3 +3 = score  6  -Tumor involves five of total seven submitted core  -Tumor involves approximately 15%, 25%, 40%, 55%, 80% of each core biopsy  - Perineural invasion: Present.         B. Prostate, Right, needle Biopsy:   - Prostatic adenocarcinoma,   -Adan grade 3 +3 = score  6  -Tumor involves three of total six submitted core  -Tumor involves approximately 5%,15%, 25% of each core biopsy  - Perineural invasion: Present.      7/18/2023 -  Cancer Staged    Staging form: Prostate, AJCC 8th Edition  - Clinical stage from 7/18/2023: Stage IIIA (cT2a, cN0, cM0, PSA: 24.2, Grade Group: 1) - Signed by Shi Vera MD on 8/28/2023  Stage prefix: Initial diagnosis  Prostate specific antigen (PSA) range: 20 or greater  [place] primary pattern: 3  [place] secondary pattern: 3  Histologic grading system: 5 grade system       8/23/2023 Initial Diagnosis    Prostate cancer (HCC)     9/7/2023 -  Hormone  Therapy    Firmagon 240 mg     10/5/2023 -  Hormone Therapy    Lupron 45 mg     11/1/2023 - 1/4/2024 Radiation    Treatments:  Course: C1  Plan ID Energy Fractions Dose per Fraction (cGy) Dose Correction (cGy) Total Dose Delivered (cGy) Elapsed Days   Prostate CD 10X 19 / 19 180 0 3,420 28   Prostate SV 10X 25 / 25 180 0 4,500 35    Treatment Dates:  11/1/2023 - 1/4/2024.      3/21/2024 -  Hormone Therapy    Lupron 45 mg     9/23/2024 -  Hormone Therapy    Lupron 45 mg         Review of Systems:  Review of Systems   Genitourinary: Negative.        Clinical Trial: no    IPSS Questionnaire (AUA-7):  Over the past month…    1)  How often have you had a sensation of not emptying your bladder completely after you finish urinating?  0 - Not at all   2)  How often have you had to urinate again less than two hours after you finished urinating? 0 - Not at all   3)  How often have you found you stopped and started again several times when you urinated?  0 - Not at all   4) How difficult have you found it to postpone urination?  0 - Not at all   5) How often have you had a weak urinary stream?  0 - Not at all   6) How often have you had to push or strain to begin urination?  0 - Not at all   7) How many times did you most typically get up to urinate from the time you went to bed until the time you got up in the morning?  1 - 1 time   Total Score:  1           Health Maintenance   Topic Date Due    Hepatitis C Screening  Never done    Medicare Annual Wellness Visit (AWV)  Never done    Pneumococcal Vaccine: 65+ Years (1 of 2 - PCV) Never done    BMI: Followup Plan  Never done    Zoster Vaccine (1 of 2) Never done    Fall Risk  Never done    RSV Vaccine Age 60+ Years (1 - 1-dose 75+ series) Never done    COVID-19 Vaccine (2 - Moderna risk series) 05/01/2021    Influenza Vaccine (1) Never done    Depression Screening  07/23/2025    BMI: Adult  09/23/2025    Meningococcal B Vaccine  Aged Out    RSV Vaccine age 0-20 Months  Aged  Out    HIB Vaccine  Aged Out    IPV Vaccine  Aged Out    Hepatitis A Vaccine  Aged Out    Meningococcal ACWY Vaccine  Aged Out    HPV Vaccine  Aged Out     Patient Active Problem List   Diagnosis    Prostate cancer (HCC)    Encounter for support and coordination of transition of care    Androgen deprivation therapy     Past Medical History:   Diagnosis Date    Asthma     Hyperlipidemia     Prostate cancer (HCC)      Past Surgical History:   Procedure Laterality Date    CATARACT EXTRACTION Bilateral     TOOTH EXTRACTION      US GUIDED PROSTATE BIOPSY  2023     Family History   Problem Relation Age of Onset    Heart disease Mother      Social History     Socioeconomic History    Marital status: /Civil Union     Spouse name: Not on file    Number of children: Not on file    Years of education: Not on file    Highest education level: Not on file   Occupational History    Not on file   Tobacco Use    Smoking status: Former     Current packs/day: 0.00     Average packs/day: 1.5 packs/day for 27.0 years (40.5 ttl pk-yrs)     Types: Cigarettes     Start date:      Quit date:      Years since quittin.0     Passive exposure: Past    Smokeless tobacco: Former     Types: Chew     Quit date:    Vaping Use    Vaping status: Never Used   Substance and Sexual Activity    Alcohol use: Yes     Alcohol/week: 4.0 standard drinks of alcohol     Types: 4 Cans of beer per week    Drug use: Never    Sexual activity: Not Currently   Other Topics Concern    Not on file   Social History Narrative    Not on file     Social Drivers of Health     Financial Resource Strain: Not on file   Food Insecurity: Not on file   Transportation Needs: Not on file   Physical Activity: Not on file   Stress: Not on file   Social Connections: Not on file   Intimate Partner Violence: Not on file   Housing Stability: Not on file       Current Outpatient Medications:     albuterol (PROVENTIL HFA,VENTOLIN HFA) 90 mcg/act inhaler, as  needed, Disp: , Rfl:     Calcium Carb-Cholecalciferol (calcium carbonate-vitamin D) 500 mg-5 mcg tablet, Take 1 tablet by mouth 2 (two) times a day with meals, Disp: 180 tablet, Rfl: 3    Triamcinolone Acetonide (Nasacort Allergy) 55 MCG/ACT nasal spray, INSTILL 2 SPRAYS INTO EACH NOSTRIL NIGHTLY., Disp: , Rfl:   No Known Allergies  There were no vitals filed for this visit.

## 2025-01-17 NOTE — ASSESSMENT & PLAN NOTE
Peter Kaiser 1945 is a 79 y.o. male with excellent performance status and minimal comorbidities with a history of clinical T2aN0 high-volume Adan score 6 (3+3) prostate cancer with a pretreatment PSA of 24.24 ng/mL.  He completed a course of definitive radiation therapy on 1/4/24 with concurrent ADT.     Clinically DEBORA prostate cancer.  -PSA scheduled March 2025 with Urology follow-up afterwards.  Compliant with Urology FU.  -4th and final Lupron injection scheduled with Urology in March 2025.  -FU 6 months    Slow urinary stream symptoms very mild.    -Discussed with patient that if concerned or bothered by slow stream, can trial tamsulosin.  Patient prefer no medication and overall feels well with  symptoms at this time.  I find this very reasonsable.     Patient notes some mild proximal leg weakness with continued activity.  He demonstrated normal gait and no issues with sitting to -standing during today's visit.  Suspect this may be related to long term ADT.    -Discussed that he could consider shortening course to 18 months, but deferred for standard 2 years.  -Recommended continued activity, mild weight resistance training, and encouraged activities that can help improve balance.  Could consider PT referral if persistent and he is willing.  -If symptoms progress, then recommend discussion with PCP for further evaluation and intervention.

## 2025-03-07 ENCOUNTER — TELEPHONE (OUTPATIENT)
Dept: UROLOGY | Facility: CLINIC | Age: 80
End: 2025-03-07

## 2025-03-07 NOTE — TELEPHONE ENCOUNTER
Called and left VM for the PT per CC. PSA is needed PTV with Melissa SHI on 3/13/25. PT is scheduled to also get Lupron. Office number left for the PT if any questions.

## 2025-03-08 ENCOUNTER — APPOINTMENT (OUTPATIENT)
Dept: LAB | Facility: CLINIC | Age: 80
End: 2025-03-08
Payer: MEDICARE

## 2025-03-08 DIAGNOSIS — C61 PROSTATE CANCER (HCC): ICD-10-CM

## 2025-03-08 LAB — PSA SERPL-MCNC: 0.01 NG/ML (ref 0–4)

## 2025-03-08 PROCEDURE — 84153 ASSAY OF PSA TOTAL: CPT

## 2025-03-08 PROCEDURE — 36415 COLL VENOUS BLD VENIPUNCTURE: CPT

## 2025-03-12 NOTE — PROGRESS NOTES
Name: Peter Kaiser      : 1945      MRN: [id number]  Encounter Provider: Melissa Roberts PA-C  Encounter Date: 3/13/2025   Encounter department: [facility]OLOG[facility] [place]  :  Assessment & Plan  Prostate cancer (HCC)  High Volume Adan 6 prostate cancer  - Pretreatment PSA 24  - Radiation completed 24  - Undergoing ADT for 2 years. Lupron 4/4 given today  - PSA (3/8/25) 0.015  - Discussed weight bearing exercises and vitamin D and calcium  - Continue following with rad onc  - Follow up in 6 months PSA prior to visit  Orders:    PSA Total, Diagnostic; Future    leuprolide (LUPRON DEPOT 6 MONTH KIT) IM injection kit 45 mg        History of Present Illness   Peter Kaiser is a 80 y.o. male who presents for follow up.      Patient with PSA of 24. Recommendations from rad onc and urology MD to have ADT for 18-24 months with radiation. Patient here today for Lupron 4/4, Lupron schedule as below. PSA undetectable. Denies new or worsening symptoms. Following with rad onc.     ADT:  - Firmagon (23)  - Lupron 1/4 (10/5/23)  - Lupron 2/4 (3/21/24)  - Lupron 3/4 (24)  - Lupron 4/4 (3/13/25)    Review of Systems   Constitutional:  Negative for activity change, appetite change, chills and fever.   HENT:  Negative for congestion and trouble swallowing.    Respiratory:  Negative for cough and shortness of breath.    Cardiovascular:  Negative for chest pain, palpitations and leg swelling.   Gastrointestinal:  Negative for abdominal pain, constipation, diarrhea, nausea and vomiting.   Genitourinary:  Negative for difficulty urinating, dysuria, flank pain, frequency, hematuria and urgency.   Musculoskeletal:  Negative for back pain and gait problem.   Skin:  Negative for wound.   Allergic/Immunologic: Negative for immunocompromised state.   Neurological:  Negative for dizziness and syncope.   Hematological:  Does not bruise/bleed easily.   Psychiatric/Behavioral:  Negative for confusion.     All other systems reviewed and are negative.         Objective   There were no vitals taken for this visit.    Physical Exam  Constitutional:       Appearance: Normal appearance.   HENT:      Head: Normocephalic.      Nose: Nose normal.      Mouth/Throat:      Pharynx: Oropharynx is clear.   Eyes:      Extraocular Movements: Extraocular movements intact.      Pupils: Pupils are equal, round, and reactive to light.   Pulmonary:      Effort: Pulmonary effort is normal.   Musculoskeletal:         General: Normal range of motion.      Cervical back: Normal range of motion.   Skin:     General: Skin is warm.   Neurological:      General: No focal deficit present.      Mental Status: He is alert and oriented to person, place, and time. Mental status is at baseline.   Psychiatric:         Mood and Affect: Mood normal.         Behavior: Behavior normal.         Thought Content: Thought content normal.         Judgment: Judgment normal.           Results   Lab Results   Component Value Date    PSA 0.015 03/08/2025    PSA <0.008 09/18/2024    PSA 0.02 03/16/2024     Lab Results   Component Value Date    CALCIUM 9.6 11/20/2023    K 5.3 11/20/2023    CO2 28 11/20/2023     11/20/2023    BUN 33 (H) 11/20/2023    CREATININE 1.24 11/20/2023     Lab Results   Component Value Date    WBC 5.27 11/20/2023    HGB 13.3 11/20/2023    HCT 38.9 11/20/2023    MCV 93 11/20/2023     11/20/2023       Office Urine Dip  No results found for this or any previous visit (from the past hour).

## 2025-03-12 NOTE — ASSESSMENT & PLAN NOTE
High Volume [place] 6 prostate cancer  - Pretreatment PSA 24  - Radiation completed 1/4/24  - Undergoing ADT for 2 years. Lupron 4/4 given today  - PSA (3/8/25) 0.015  - Discussed weight bearing exercises and vitamin D and calcium  - Continue following with rad onc  - Follow up in 6 months PSA prior to visit  Orders:    PSA Total, Diagnostic; Future    leuprolide (LUPRON DEPOT 6 MONTH KIT) IM injection kit 45 mg

## 2025-03-13 ENCOUNTER — OFFICE VISIT (OUTPATIENT)
Dept: UROLOGY | Facility: CLINIC | Age: 80
End: 2025-03-13
Payer: MEDICARE

## 2025-03-13 VITALS
SYSTOLIC BLOOD PRESSURE: 126 MMHG | DIASTOLIC BLOOD PRESSURE: 78 MMHG | TEMPERATURE: 98 F | HEIGHT: 68 IN | OXYGEN SATURATION: 98 % | BODY MASS INDEX: 30.31 KG/M2 | WEIGHT: 200 LBS | HEART RATE: 70 BPM

## 2025-03-13 DIAGNOSIS — C61 PROSTATE CANCER (HCC): Primary | ICD-10-CM

## 2025-03-13 PROCEDURE — 96402 CHEMO HORMON ANTINEOPL SQ/IM: CPT

## 2025-03-13 PROCEDURE — 99213 OFFICE O/P EST LOW 20 MIN: CPT | Performed by: PHYSICIAN ASSISTANT

## 2025-03-13 RX ORDER — FLUTICASONE FUROATE AND VILANTEROL TRIFENATATE 100; 25 UG/1; UG/1
POWDER RESPIRATORY (INHALATION)
COMMUNITY
Start: 2025-02-18

## 2025-03-13 RX ORDER — CLOBETASOL PROPIONATE 0.5 MG/G
CREAM TOPICAL
COMMUNITY
Start: 2025-02-03